# Patient Record
Sex: MALE | Race: WHITE | NOT HISPANIC OR LATINO | Employment: FULL TIME | URBAN - METROPOLITAN AREA
[De-identification: names, ages, dates, MRNs, and addresses within clinical notes are randomized per-mention and may not be internally consistent; named-entity substitution may affect disease eponyms.]

---
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
## 2022-08-22 ENCOUNTER — OFFICE VISIT (OUTPATIENT)
Dept: BEHAVIORAL HEALTH | Facility: CLINIC | Age: 22
End: 2022-08-22
Payer: COMMERCIAL

## 2022-08-22 DIAGNOSIS — F43.12 PROLONGED POSTTRAUMATIC STRESS DISORDER: ICD-10-CM

## 2022-08-22 DIAGNOSIS — F43.12 CHRONIC POSTTRAUMATIC STRESS SYNDROME: ICD-10-CM

## 2022-08-22 PROCEDURE — 90791 PSYCH DIAGNOSTIC EVALUATION: CPT | Performed by: PSYCHOLOGIST

## 2022-08-30 ENCOUNTER — OFFICE VISIT (OUTPATIENT)
Dept: MEDICAL GROUP | Facility: LAB | Age: 22
End: 2022-08-30
Payer: COMMERCIAL

## 2022-08-30 VITALS
SYSTOLIC BLOOD PRESSURE: 110 MMHG | OXYGEN SATURATION: 95 % | BODY MASS INDEX: 28.84 KG/M2 | HEART RATE: 96 BPM | TEMPERATURE: 97.1 F | DIASTOLIC BLOOD PRESSURE: 60 MMHG | WEIGHT: 206 LBS | RESPIRATION RATE: 12 BRPM | HEIGHT: 71 IN

## 2022-08-30 DIAGNOSIS — R06.6 HICCUPS: ICD-10-CM

## 2022-08-30 DIAGNOSIS — R41.840 ATTENTION DEFICIT: ICD-10-CM

## 2022-08-30 PROBLEM — F43.12: Status: ACTIVE | Noted: 2022-08-30

## 2022-08-30 PROCEDURE — 99214 OFFICE O/P EST MOD 30 MIN: CPT | Performed by: NURSE PRACTITIONER

## 2022-08-30 RX ORDER — DEXTROAMPHETAMINE SACCHARATE, AMPHETAMINE ASPARTATE MONOHYDRATE, DEXTROAMPHETAMINE SULFATE AND AMPHETAMINE SULFATE 2.5; 2.5; 2.5; 2.5 MG/1; MG/1; MG/1; MG/1
10 CAPSULE, EXTENDED RELEASE ORAL EVERY MORNING
Qty: 30 CAPSULE | Refills: 0 | Status: SHIPPED | OUTPATIENT
Start: 2022-08-30 | End: 2022-09-29

## 2022-08-30 ASSESSMENT — PATIENT HEALTH QUESTIONNAIRE - PHQ9: CLINICAL INTERPRETATION OF PHQ2 SCORE: 0

## 2022-08-30 NOTE — PROGRESS NOTES
Name: Jeromy Neumann Date: [birth year]  [birth year] Time in session 60 minutes   [x] Initial Dx Eval [] Family [] Cancelled/Rescheduled [] Office visit   [] Individual [] Group [] Late Cancellation [] Virtual Session   [] Couple [] Testing [] No call/No show [] Late   [] Discharge [] Phone [x] On time: 3:00 PM []  (1)   Attended: Mr. Neumann (He wore a mask)   Observations/ Appearance/ Affect: Mr. Neumann appeared clean, well-groomed, and dressed in casual clothes. He was oriented to person, place, time and purpose, was pleasant and cooperative, lucid and articulate.  His affect was anxious and mood was sad. No suicidal or homicidal ideation described or reported. No reports of hallucinations or confusions. He participated well in this session.   Content/ Topics: Mr. Neumann reported that he was sometime anxious and depressed. He reported that he had been through a lot in his life. He reported that he thought he might have an attention deficit disorder. He reported that he experienced closed head injuries in his life and may have had concussions. He reported that when he was a child he fell and hit the back his head on the ground playing soccer. He reported that he did not remember how long he was unconscious. He reported that he needed a “deep cleaning of his brain.”    Risk issues assessed: Discussed with Mr. Neumann his current concerns regarding the Coronavirus and his concerns about contagion. For information about this virus go to the Center for Disease Control website or the [facility] Coronavirus Resource Center ([email or web address].[email or web address].[email or web address]/) for accurate information about it. Discussed how recommended precautions seemed prudent: wash hand frequently, avoid crowds, wear masks when out and about, maintain social distancing ( ? 2 meters) cover mouth when coughing, and stay home if sick until better. Discussed how [facility] is providing Telehealth psychotherapy services using the [employer] Platform in a safe, secure and  high-quality format. Discussed using the Zoom Platform if needed. Discussed how the electronic record-keeping platform here at [facility] can limit access to the psychotherapy notes and that this psychologist must william the notes as sensitive. Encouraged him to discuss with this psychologist any difficulty he may have in accessing his notes. He consented to the additional firewalls. (PHQ-9 = 17, SHARON-7 = 8). He reported that he would not harm himself and did not intend to harm himself or another.   Psychosocial and environmental problems addressed: Discussed with Mr. Neumann how the symptoms of anxiety, depression, and inattentiveness, posttraumatic stress, poor diet, forgetfulness, and poor sleep described are often observed and interpreted as marked disruptions to the Social Engagement System. Discussed how these shifts in physiological and behavioral states, distort social awareness and establish habitual defensive reactions that replace appropriate spontaneous social behavior. Discussed with his how over the next several session he will learn how the body is wired for survival and connection. Discussed how the autonomic nervous system works as our personal surveillance system, always on guard, always alert to the question: “is it safe?” The work of the autonomic nervous system is to protect us by looking for cues safety and risk, sensing moment to moment of what is happening in and around our bodies and in the connections, we have to others. Discussed how over the next several sessions we will talk more about how this system works and can inform us about how the state of the nervous system sets the table for how we will respond, act and feel. Reminded Mr. Neumann how he has been in a state of anxiety and he is highly attuned to cues of danger, real or imagined. The traumatic events and concussive events seemed to have deeply affected his ability to function. The compromised nervous system is locked in an adaptive survival  responses shaped by traumatic or overwhelming events in a story of survival that persists automatically. Discussed how the re-patterning the autonomic nervous system is possible. Ongoing experiences continue to shape the autonomic nervous system. Our approach involves interrupting the traumatic pattern with experiences that exercise the neural circuits of connection. Developing a state of connection from a state of protection makes restoration possible. Discussed with him how he will learn strategies to increase ventral vagal moments at home. Encouraged him to be aware of his feelings of safety and connection here at [facility].     Current GAF: 55   Current medications: Wellbutrin, Prilosec, Kenalog   Significant/ Recent Events: He reported that he experienced, witnessed, or was confronted with events that involved actual or threatened death or serious injury, or a threat to the physical integrity of self or others. He reported that he experienced recurrent and intrusive distressing recollections of the traumatic events, including images, thoughts, or perceptions. He reported that he experienced recurrent distressing dreams of the traumatic events. He reported that he acted or felt as if the traumatic events were recurring (including a sense of reliving the experiences, illusions, hallucinations, and dissociative flashback episodes, including those that occur on awakening or when intoxicated). He reported that he made efforts to avoid thoughts, feelings, or conversations associated with the traumatic events and often experienced detachment or estrangement from others. He reported that he made efforts to avoid activities, places, or people that arouse recollections of the traumatic events and experienced a markedly diminished interest or participation in significant activities. He reported that he had little interest in doing things and felt down or depressed. He reported that he had trouble falling and staying asleep. He  reported that he felt tired and had trouble concentrating. He reported that he felt nervous, anxious and on edge. He reported that he worried too much about different things. He reported that he had difficulty relaxing. He reported that he was restless and had difficulty sitting still. He reported that he was often easily annoyed and irritable. He reported that he often felt afraid that something bad would happen. He reported that he experienced a stressful childhood and experienced poor reasoning and judgement, poor abstract reasoning and attention deficits, impulsivity, poor organization, slow processing speed, poor working memory, poor concentration, and forgetfulness. Mr. Neumann reported that he was frustrated with the symptoms described and the impact they have in his life.  Discussed how the symptoms suggest a diagnosis of Posttraumatic Stress Disorder. Discussed how other disorders may be present. Discussed with Mr. Neumann how he might consider Eye Movement Desensitization and Reprocessing (EMDR) as a therapeutic approach to decrease his fears and anxieties associated with traumatic events. EMDR is an Information Processing Therapy that uses an eight phase approach. In the first phase his readiness for EMDR is assessed to develop a treatment plan. For adults we use the “EMDR the Breakthrough Therapy for Overcoming Anxiety, Stress and Trauma,” by Shirley Conner and Donna Keating to explain this process. Discussed the work of Shama Hinojosa and encouraged him to think of food as medicine and encouraged him to look at some of her recipes in her book, “Eat Right, Feel Right.” Discussed the work of Saba Sousa and encourage him to read her workbook, “Mindful Self-Compassion” and discussed strategies to exercise mindful self-compassion as needed. Encouraged him to learn more about Polyvagal Theory and to consider the work of Davina Pinon. Encouraged him to contain his anxious thoughts and set them aside to  discuss in these session and to engage in positive and creative activities to distract him from any disturbing material. Discussed how he might consider SPECT Studies or functional neuroimaging studies to clarify and/ or identify neurological weaknesses, strengths and abilities and support appropriate intervention strategies (Full SPECT Evaluation at the [facility]: [address], [place] [address] [phone]: website: [employer]). Discussed looking at the [place] and consider nutritional supplements to increase the nutritional values in his diet. Discussed how Polyvagal Theory, Positive Psychology (Mindful Self-Compassion), CBT, and Information Processing Theory would inform this psychotherapy and how he will develop Cognitive Behavioral Skills and Strategies to decrease and manage anxieties and fears, decrease posttraumatic stress, increase attention span, lift depression, and help him to gain control over his life, develop a healthier lifestyle, increase restful sleep and find meaning and balance in his life. He asked to return to [facility] weekly to develop this supportive psychotherapy. Discussed how this psychotherapy would focus on his health, wellbeing, and progress at his pace and in a positive direction.   Informed consent issues discussed: Mr. Neumann reported that he understood the process of this evaluation agreed to return to [facility]. Discussed how he expressed his desire to change and was willing to start the process. He reported that he was willing to make changes to his life toward a healthy lifestyle. He reported that he was aware of the problems he experienced and expressed the desire to solve those problems safely. He reported that he had a positive outlook for change. He reported that he had family and friends that were supportive and knew he needed to gain and maintain a healthy network of support.    Assignments/ Homework: Mr. Neumann agreed to initiate some of the  strategies discussed today to decrease anxieties and increase restful sleep and increase nutritional values in his diet.    Plan: Mr. Neumann agreed to monitor closely his mood and behavior.    Diagnoses: F43.12 Posttraumatic Stress Disorder [] Provisional   Treatment Plan: [] Continued [x] New [] Replaced Referral:   Suicidal [] Yes [x] No [] Medical:    Violence: [] Yes [x] No [] Psychiatric:    Next session:     [] Neurological:            Kush Quintanilla Psy.D.  Clinical Psychologist  [facility]  NPI: [id number]

## 2022-08-30 NOTE — PROGRESS NOTES
"CC  ADD    HPI:  Jeromy is a 22-year-old established male here with 2 issues:  #1- hiccuping:  new issue x a month.  No new diet.  No lifestyle changes.  Tried a combination of omeprazole, Benadryl and marijuana which helped.  Does have heartburn.  No other associated symptoms.  Enjoys eating at [employer] frequently for breakfast, fried and spicy foods.  Overall states he really does not eat very healthy.  #2-attention deficit: He is currently seeing a psychologist and his psychologist told him to ask me to allow him to try attention deficit disorder medications while they are waiting on testing.  Is not able to see a psychologist again until October.  Bothered by \"my head is loud,\" memory loss, can't think, difficulty focusing on reading.  Stays very busy with job, theater and school.  Does find Wellbutrin helpful for depression and anxiety.  Sleep has improved lately.    Exam:  /60 (BP Location: Right arm, Patient Position: Sitting, BP Cuff Size: Large adult)   Pulse 96   Temp 36.2 °C (97.1 °F)   Resp 12   Ht 1.81 m (5' 11.26\")   Wt 93.4 kg (206 lb)   SpO2 95%   Gen. appears healthy in no distress   Skin appropriate for sex and age   Neck trachea is midline  Lungs unlabored breathing  Heart regular rate  Neuro gait and station normal   Psych appropriate, calm, interactive, well-groomed    A/P:  1. Hiccups          Downgrade omeprazole to pepcid AC - before breakfast and dinner for 1-2 weeks during periods of frequent hiccupping.  Avoid spicy / fried foods and large meals / overeating.  Contact me if this is not helpful.    2.  Likely ADD:   Seeing psychology and awaiting further testing.  Has home testing to turn into psychology as well.  Trial of adderall 10 mg XR once daily in the morning.  Discussed potential side effects as well as benefits and how to take Adderall.  He will contact me if he begins to have insomnia, heart palpitations, increased anxiety, dramatic weight loss.  Otherwise I will see " him back here in 1 month.  I encouraged him to start an exercise program.

## 2022-09-29 ENCOUNTER — OFFICE VISIT (OUTPATIENT)
Dept: MEDICAL GROUP | Facility: LAB | Age: 22
End: 2022-09-29
Payer: COMMERCIAL

## 2022-09-29 VITALS
SYSTOLIC BLOOD PRESSURE: 100 MMHG | HEIGHT: 71 IN | BODY MASS INDEX: 27.86 KG/M2 | WEIGHT: 199 LBS | TEMPERATURE: 97.1 F | DIASTOLIC BLOOD PRESSURE: 64 MMHG | OXYGEN SATURATION: 96 % | RESPIRATION RATE: 12 BRPM | HEART RATE: 88 BPM

## 2022-09-29 DIAGNOSIS — F43.12 CHRONIC POSTTRAUMATIC STRESS SYNDROME: ICD-10-CM

## 2022-09-29 DIAGNOSIS — R41.840 ATTENTION DEFICIT: ICD-10-CM

## 2022-09-29 DIAGNOSIS — Z23 NEED FOR INFLUENZA VACCINATION: ICD-10-CM

## 2022-09-29 PROCEDURE — 99214 OFFICE O/P EST MOD 30 MIN: CPT | Mod: 25 | Performed by: NURSE PRACTITIONER

## 2022-09-29 PROCEDURE — 90686 IIV4 VACC NO PRSV 0.5 ML IM: CPT | Performed by: NURSE PRACTITIONER

## 2022-09-29 PROCEDURE — 90471 IMMUNIZATION ADMIN: CPT | Performed by: NURSE PRACTITIONER

## 2022-09-29 RX ORDER — DEXTROAMPHETAMINE SACCHARATE, AMPHETAMINE ASPARTATE MONOHYDRATE, DEXTROAMPHETAMINE SULFATE AND AMPHETAMINE SULFATE 3.75; 3.75; 3.75; 3.75 MG/1; MG/1; MG/1; MG/1
15 CAPSULE, EXTENDED RELEASE ORAL EVERY MORNING
Qty: 30 CAPSULE | Refills: 0 | Status: SHIPPED | OUTPATIENT
Start: 2022-09-29 | End: 2022-11-11 | Stop reason: SDUPTHER

## 2022-09-29 NOTE — PROGRESS NOTES
"CC  Add f/u        HPI:  Jeromy is a 22-year-old established male here to follow-up on attention deficit.  Also followed by psychology.  Adderall initiated at our visit on August 30 -Adderall XR 10 mg daily. Denies insomnia, heart palpitations, diarrhea or increased anxiety.  Focus and motivation is better.  Headache when adderall wore off in the first week or so but this resolved.  Denies any current negative side effects of adderall.      Anxiety / depression controlled with wellbutrin.     Exam:  /64 (BP Location: Left arm, Patient Position: Sitting, BP Cuff Size: Large adult)   Pulse 88   Temp 36.2 °C (97.1 °F)   Resp 12   Ht 1.81 m (5' 11.26\")   Wt 90.3 kg (199 lb)   SpO2 96%   Gen: NAD  Resp: nonlabored.  Able to speak in full sentences  Psy: pleasant / cooperative.   Neuro:  Alert and oriented x 3    A/P:  1. Attention deficit  -improving but feels that he needs a higher dosage. Trial of 15 mg for the next month and then f/u via my chart with how he responds to adderall.    - amphetamine-dextroamphetamine (ADDERALL XR) 15 MG XR capsule; Take 1 Capsule by mouth every morning for 30 days.  Dispense: 30 Capsule; Refill: 0    2. Chronic posttraumatic stress syndrome  -Stable with Wellbutrin, psychology and Adderall.  Looking forward to moving to [place] to work at [employer] for 9 months.    3. Need for influenza vaccination  - INFLUENZA VACCINE QUAD INJ (PF)      In terms of the patient moving to [place] from January until August, I encouraged him to  look into mail order pharmacy for 8 month stent living in [place].   - other option : call local pharmacy in [place] to see if we can send through Adderal in [place] but typically [place] will not take controlled substances from [place], especially nurse practitioner.  Final option would be filling a 90-day supply immediately prior to leaving and then having a family member bring a refill when they come to visit in a few months.  "

## 2022-10-04 ENCOUNTER — OFFICE VISIT (OUTPATIENT)
Dept: BEHAVIORAL HEALTH | Facility: CLINIC | Age: 22
End: 2022-10-04
Payer: COMMERCIAL

## 2022-10-04 DIAGNOSIS — F90.0 ATTENTION DEFICIT HYPERACTIVITY DISORDER (ADHD), INATTENTIVE TYPE, MODERATE: ICD-10-CM

## 2022-10-04 DIAGNOSIS — F43.12 CHRONIC POSTTRAUMATIC STRESS SYNDROME: ICD-10-CM

## 2022-10-04 PROCEDURE — 90837 PSYTX W PT 60 MINUTES: CPT | Performed by: PSYCHOLOGIST

## 2022-10-06 PROBLEM — F90.0 ATTENTION DEFICIT HYPERACTIVITY DISORDER (ADHD), INATTENTIVE TYPE, MODERATE: Status: ACTIVE | Noted: 2022-10-06

## 2022-10-06 NOTE — PROGRESS NOTES
Name: Jeromy Neumann Date: 10/4/22  3/28/02 Time in session 60 minutes   [] Initial Dx Eval [] Family [] Cancelled/Rescheduled [] Office visit   [x] Individual [] Group [] Late Cancellation [] Virtual Session   [] Couple [] Testing [] No call/No show [] Late   [] Discharge [] Phone [x] On time: 8:00 AM []  (2)   Attended: Mr. Neumann (He wore a mask)   Observations/ Appearance/ Affect: Mr. Neumnan appeared clean, well-groomed, and dressed in casual clothes. He was oriented to person, place, time and purpose, was pleasant and cooperative, lucid and articulate.  His affect was anxious and mood was sad. No suicidal or homicidal ideation described or reported. No reports of hallucinations or confusions. He participated well in this session.   Content/ Topics: Mr. Neumann reported that he was doing well. He reported that his doctor started him on Adderall and he would start taking 15mg daily in a few days. He reported that he struggled to eat healthy. He reported that getting restful sleep was difficult. He reported that he was often very busy late at night or early in the morning. 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 He reported that next week he starts as the  at [employer] in the [employer]. He reported that in January he leaves for [place]. He reported that he was accepted in [employer] (a prestigious  while living at [facility]). He reported that he was anxious because he has not lived away from home, did not know how to live on his own and that he would be alone there.  On the ADHD Adult Symptoms Checklist, he indicated he had symptoms of hyperactivity (that he talks excessively and often feels overwhelmed) and inattention (he complains that he reads, “but it doesn't register,” that he is frustrated over his inability to organize, experiences poor time management, tends to misplace  belongings, was easily distracted and prefers multi-tasking). On the ASRS (Adult Self-Report Scale) Part A (symptoms of inattention) score was a 25, and Part B (symptoms of hyperactivity and impulsivity) was a score of 17. These scores supported the diagnosis of an Attention Deficit Disorder.    Risk issues assessed: Discussed with Mr. Neumann how there are shifts in physiological and behavioral states that distort social awareness and establish habitual defensive reactions that replace appropriate spontaneous social behavior. Discussed with him how the body is wired for survival and connection. Discussed how the autonomic nervous system works as our personal surveillance system, always on guard, always alert to the question: “is it safe?” The work of the autonomic nervous system is to protect us by looking for cues safety and risk, sensing moment to moment of what is happening in and around our bodies and in the connections, we have to others. Discussed how this system works and can inform us about how the state of the nervous system sets the table for how we will respond, act and feel. Reminded him how his medication regimen continues to be adjusted and he will be able to stay attentive, focused, organized and feel safe and connected with others. He reported that he would not harm himself and did not intend to harm himself or another.   Psychosocial and environmental problems addressed: He reported that he was not bothered by or disturbed by symptoms consistent with posttraumatic stress disorder. Discussed how on his medication he felt better. Reminded Mr. Neumann how the re-patterning the autonomic nervous system is possible. Ongoing experiences continue to shape the autonomic nervous system. Our approach involves interrupting the traumatic pattern with experiences that exercise the neural circuits of connection. Developing a state of connection from a state of protection makes restoration possible. Discussed how  he has been in a state of anxiety and defensiveness for a very long time and it will take some practice to re-pattern to a state of safety and connection. Discussed how in the state of defensiveness he is highly tuned to identify and recognize cues of danger, real or imagined, and may often feel anxious, distracted and inattentive and may have moments of disconnection and dissociates and will struggle to engage and participate. Discussed how the work of the autonomic nervous system is to protect us by looking for cues safety and risk, sensing moment to moment of what is happening in and around our bodies and in the connections, we have to others. Discussed how this system works and can inform us about how the state of the nervous system sets the table for how we will respond, act and feel. Discussed how the vagus nerve helps to regulate the heart and works to speed up or slow down our heart rate and effectively respond to any given moment. Discussed how this is called the vagal brake. It is this interaction between the parasympathetic and sympathetic nervous systems. The parasympathetic keeps the nervous system charged and the brake allows for more of the sympathetic energy into the system. Discussed how our breath helps us manage this brake. Using guided imagery to find and access a Safe Place is a way of exercising control of the vagal brake through relaxing breathing. Davina Pinon reminds us that every breath cycle is the vagal brake at work. By using your Safe Place, you are connecting to your connection with this brake and its built-in pattern of relaxation on the inhalation and reengagement on the exhalation.  This braking action brings flexibility to our responses and a sense of ease to transitions.   Current GAF: 55   Current medications: Adderall XR, Wellbutrin, Prilosec, Kenalog   Significant/ Recent Events: Discussed with Mr. Neumann how the focus of this psychotherapy is on skill building, on supporting his  physical and strengthening his mental health and increasing function. He demonstrated some positive affirmations without noticing them. Discussed how affirmations were helpful and skillful at thinking constructively and effectively change the tone and purpose of the actions. Described how affirmations were brief, positively worded statements in the “present tense.” Discussed the work of Shama Hinojosa and encouraged him to think of food as medicine and encouraged him to make some of her recipes in her book, “Eat Right, Feel Right.” Discussed the work of Saba Sousa and encourage him to read her workbook, “Mindful Self-Compassion” and exercise mindful self-compassion as needed. Encouraged him to learn more about Polyvagal Theory and to consider the work of Davina Pinon. Encouraged him to contain his anxious thoughts and set them aside to discuss in these session and to engage in positive and creative activities to distract him from any disturbing material. Discussed looking at the [place] and consider nutritional supplements to increase the nutritional values in his diet. Discussed how Polyvagal Theory, Positive Psychology (Mindful Self-Compassion), CBT, and Information Processing Theory would inform this psychotherapy and how he will develop Cognitive Behavioral Skills and Strategies to decrease and manage anxieties and fears, decrease posttraumatic stress, increase attention span, lift depression, and help him to gain control over his life, develop a healthier lifestyle, increase restful sleep and find meaning and balance in his life. He asked to return to [facility] weekly to develop this supportive psychotherapy. Discussed how this psychotherapy would focus on his health, wellbeing, and progress at his pace and in a positive direction.   Informed consent issues discussed: Mr. Neumann reported that he understood the process of this evaluation agreed to return to [facility]. Discussed how he expressed his desire to change and was  willing to start the process. He reported that he was willing to make changes to his life toward a healthy lifestyle. He reported that he was aware of the problems he experienced and expressed the desire to solve those problems safely. He reported that he had a positive outlook for change. He reported that he had family and friends that were supportive and knew he needed to gain and maintain a healthy network of support.    Assignments/ Homework: Mr. Neumann agreed to initiate some of the strategies discussed today to decrease anxieties and increase restful sleep and increase nutritional values in his diet. He agreed to develop a list of affirmations and to practice the vagal brake.   Plan: Mr. Neumann agreed to monitor closely his mood and behavior.    Diagnoses: F90.0 Attention-Deficit/ Hyperactivity Disorder, F43.12 Posttraumatic Stress Disorder [] Provisional   Treatment Plan: [x] Continued [] New [] Replaced Referral:   Suicidal [] Yes [x] No [] Medical:    Violence: [] Yes [x] No [] Psychiatric:    Next session:     [] Neurological:            Kush Quintanilla Psy.D.  Clinical Psychologist  [facility]  NPI: [id number]

## 2022-11-01 ENCOUNTER — OFFICE VISIT (OUTPATIENT)
Dept: BEHAVIORAL HEALTH | Facility: CLINIC | Age: 22
End: 2022-11-01
Payer: COMMERCIAL

## 2022-11-01 DIAGNOSIS — F90.0 ATTENTION DEFICIT HYPERACTIVITY DISORDER (ADHD), INATTENTIVE TYPE, MODERATE: ICD-10-CM

## 2022-11-01 PROCEDURE — 90837 PSYTX W PT 60 MINUTES: CPT | Performed by: PSYCHOLOGIST

## 2022-11-07 NOTE — PROGRESS NOTES
Name: Jeromy Neumann Date: 22  3/28/02 Time in session 60 minutes   [] Initial Dx Eval [] Family [] Cancelled/Rescheduled [] Office visit   [x] Individual [] Group [] Late Cancellation [] Virtual Session   [] Couple [] Testing [] No call/No show [] Late   [] Discharge [] Phone [x] On time: 3:00 PM []  (3)   Attended: Mr. Neumann (He wore a mask)   Observations/ Appearance/ Affect: Mr. Neumann appeared clean, well-groomed, and dressed in casual clothes. He was oriented to person, place, time and purpose, was pleasant and cooperative, lucid and articulate.  His affect was anxious and mood was sad. No suicidal or homicidal ideation described or reported. No reports of hallucinations or confusions. He participated well in this session.   Content/ Topics: Mr. Neumann reported that he was doing well. He reported that his doctor started him on Adderall 15mg daily. He reported that he felt that it was almost working well. He reported that he takes it first thing in the morning. He reported that he experienced a tired haze by evening. He reported that he was enjoying reading, something he had never enjoyed. He reported that he was enjoying cooking.    Risk issues assessed: Discussed with Mr. Neumann how there are shifts in physiological and behavioral states that distort social awareness and establish habitual defensive reactions that replace appropriate spontaneous social behavior. Discussed with him how the body is wired for survival and connection. Discussed how the autonomic nervous system works as our personal surveillance system, always on guard, always alert to the question: “is it safe?” The work of the autonomic nervous system is to protect us by looking for cues safety and risk, sensing moment to moment of what is happening in and around our bodies and in the connections, we have to others. Discussed how this system works and can inform us about how the state of the nervous system sets the table for how we will  respond, act and feel. Reminded him how his medication regimen continues to be adjusted and he will be able to stay attentive, focused, organized and feel safe and connected with others. He reported that he did not experience posttraumatic stress over the past few weeks. He reported that he would not harm himself and did not intend to harm himself or another.   Psychosocial and environmental problems addressed: He reported that he was not bothered by or disturbed by symptoms consistent with posttraumatic stress disorder. Discussed how on his medication he felt better. Discussed how the work of the autonomic nervous system is to protect us by looking for cues safety and risk, sensing moment to moment of what is happening in and around our bodies and in the connections, we have to others. Discussed how this system works and can inform us about how the state of the nervous system sets the table for how we will respond, act and feel.   Current GAF: 55   Current medications: Adderall XR, Wellbutrin, Prilosec, Kenalog   Significant/ Recent Events: Discussed with Mr. Neumann how he liked the pace of this psychotherapy and noticed how much better he felt overall since he started coming to [facility]. Discussed how the re-patterning the autonomic nervous system is possible. Ongoing experiences continue to shape the autonomic nervous system. Our approach involves interrupting the traumatic pattern with experiences that exercise the neural circuits of connection. Developing a state of connection from a state of protection makes restoration possible. Discussed strategies to increase ventral vagal brakes. Encouraged him to be aware of his feelings of safety and connection here at [facility]. Discussed how psychotherapy is done safely. Discussed the work of Shama Hinojosa and encouraged him to think of food as medicine and encouraged him to make some of her recipes in her book, “Eat Right, Feel Right.” Discussed the work of Saba Sousa and  encourage him to read her workbook, “Mindful Self-Compassion” and exercise mindful self-compassion as needed. Encouraged him to learn more about Polyvagal Theory and to consider the work of Davina Pinon. Discussed looking at the [place] and consider nutritional supplements to increase the nutritional values in his diet. Discussed how Polyvagal Theory, Positive Psychology (Mindful Self-Compassion), CBT, and Information Processing Theory would inform this psychotherapy and how he will develop Cognitive Behavioral Skills and Strategies to decrease and manage anxieties and fears, decrease posttraumatic stress, increase attention span, lift depression, and help him to gain control over his life, develop a healthier lifestyle, increase restful sleep and find meaning and balance in his life. He asked to return to [facility] weekly to develop this supportive psychotherapy. Discussed how this psychotherapy would focus on his health, wellbeing, and progress at his pace and in a positive direction.   Informed consent issues discussed: Mr. Neumann reported that he understood the process of this evaluation agreed to return to [facility]. Discussed how he expressed his desire to change and was willing to start the process. He reported that he was willing to make changes to his life toward a healthy lifestyle. He reported that he was aware of the problems he experienced and expressed the desire to solve those problems safely. He reported that he had a positive outlook for change. He reported that he had family and friends that were supportive and knew he needed to gain and maintain a healthy network of support.    Assignments/ Homework: Mr. Neumann agreed to initiate some of the strategies discussed today to decrease anxieties and increase restful sleep and increase nutritional values in his diet. He agreed to develop a list of affirmations and to practice the vagal brake.   Plan: Mr. Neumann agreed to monitor closely his mood and behavior.     Diagnoses: F90.0 Attention-Deficit/ Hyperactivity Disorder, F43.12 Posttraumatic Stress Disorder [] Provisional   Treatment Plan: [x] Continued [] New [] Replaced Referral:   Suicidal [] Yes [x] No [] Medical:    Violence: [] Yes [x] No [] Psychiatric:    Next session:     [] Neurological:            Kush Quintanilla Psy.D.  Clinical Psychologist  [facility]  NPI: [id number]

## 2022-11-11 DIAGNOSIS — R41.840 ATTENTION DEFICIT: ICD-10-CM

## 2022-11-12 NOTE — TELEPHONE ENCOUNTER
Received request via: Patient    Was the patient seen in the last year in this department? Yes    Does the patient have an active prescription (recently filled or refills available) for medication(s) requested? No    Does the patient have [facility] and need 100 day supply (blood pressure, diabetes and cholesterol meds only)? Patient does not have [facility]  amphetamine-dextroamphetamine (ADDERALL XR) 15 MG XR capsule    Pcp ooo please advise on 30 days refill

## 2022-11-14 RX ORDER — DEXTROAMPHETAMINE SACCHARATE, AMPHETAMINE ASPARTATE MONOHYDRATE, DEXTROAMPHETAMINE SULFATE AND AMPHETAMINE SULFATE 3.75; 3.75; 3.75; 3.75 MG/1; MG/1; MG/1; MG/1
15 CAPSULE, EXTENDED RELEASE ORAL EVERY MORNING
Qty: 30 CAPSULE | Refills: 0 | Status: SHIPPED | OUTPATIENT
Start: 2022-11-14 | End: 2022-12-15 | Stop reason: SDUPTHER

## 2022-12-06 ENCOUNTER — OFFICE VISIT (OUTPATIENT)
Dept: BEHAVIORAL HEALTH | Facility: CLINIC | Age: 22
End: 2022-12-06
Payer: COMMERCIAL

## 2022-12-06 DIAGNOSIS — F90.0 ATTENTION DEFICIT HYPERACTIVITY DISORDER (ADHD), INATTENTIVE TYPE, MODERATE: ICD-10-CM

## 2022-12-06 DIAGNOSIS — F43.12 CHRONIC POSTTRAUMATIC STRESS SYNDROME: ICD-10-CM

## 2022-12-06 PROCEDURE — 90837 PSYTX W PT 60 MINUTES: CPT | Performed by: PSYCHOLOGIST

## 2022-12-08 NOTE — PROGRESS NOTES
Name: Jeromy Neumann Date: 22  3/28/02 Time in session 60 minutes   [] Initial Dx Eval [] Family [] Cancelled/Rescheduled [] Office visit   [x] Individual [] Group [] Late Cancellation [] Virtual Session   [] Couple [] Testing [] No call/No show [] Late   [] Discharge [] Phone [x] On time: 3:00 PM []  (4)   Attended: Mr. Neumann (He wore a mask)   Observations/ Appearance/ Affect: Mr. Neumann appeared clean, well-groomed, and dressed in warm clothes. He was oriented to person, place, time, and purpose, was pleasant and cooperative, lucid, and articulate. His affect was pleasant, and mood was euthymic. No suicidal or homicidal ideation described or reported. No reports of hallucinations or confusions. He participated well in this session.   Content/ Topics: Mr. Neumann reported that he was doing well. He reported that his doctor started him on Adderall 15mg daily. He reported that he still struggled with moments of impulsivity but was more aware of that and was able to be more mindful and “reign it in.” He reported that he takes his medication first thing in the morning. He reported that he was often tapping, and it was helpful. He reported that he noticed that he was easily triggered when someone is talking to him and calls him by his first name. He reported that it makes him feel like he did something wrong. He reported that he is also triggered when someone says, “we need to talk.” He reported that he used to work very hard not to forget things and now that his brain is working better, he felt he did not have to work that hard anymore, but now he was forgetful. Discussed how it was prudent to talk with his doctor about adjusting the medication regimen. He reported that he noticed that when it wears off it was not a steep drop off. 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 He reported that he was  anxious. He reported that things were going well here, and he felt made it much harder to leave.    Risk issues assessed: Discussed with Mr. Neumann how there are shifts in physiological and behavioral states that distort social awareness and establish habitual defensive reactions that replace appropriate spontaneous social behavior. Discussed with him how the body is wired for survival and connection. Discussed how the autonomic nervous system works as our personal surveillance system, always on guard, always alert to the question: “is it safe?” The work of the autonomic nervous system is to protect us by looking for cues safety and risk, sensing moment to moment of what is happening in and around our bodies and in the connections, we have to others. Discussed how this system works and can inform us about how the state of the nervous system sets the table for how we will respond, act, and feel. Reminded him how his medication regimen will continue to be adjusted and he will be able to stay attentive, focused, organized, and feel safe and connected with others. He reported that he would not harm himself and did not intend to harm himself or another.   Psychosocial and environmental problems addressed: He reported that he was aware of symptoms consistent with posttraumatic stress disorder. Discussed how tapping was helpful. Discussed how the work of the autonomic nervous system is to protect us by looking for cues safety and risk, sensing moment to moment of what is happening in and around our bodies and in the connections, we have to others. Discussed how this system works and can inform us about how the state of the nervous system sets the table for how we will respond, act, and feel.   Current GAF: 55   Current medications: Adderall XR, Wellbutrin, Prilosec, Kenalog   Significant/ Recent Events: Discussed with Mr. Neumann how he liked the pace of this psychotherapy and noticed how much better he felt overall since  he started coming to [facility]. Discussed how the re-patterning the autonomic nervous system is possible. Ongoing experiences continue to shape the autonomic nervous system. Our approach involves interrupting the traumatic pattern with experiences that exercise the neural circuits of connection. Developing a state of connection from a state of protection makes restoration possible. Discussed strategies to increase ventral vagal brakes. Encouraged him to be aware of his feelings of safety and connection here at [facility]. Discussed how psychotherapy is done safely. Discussed the work of Shama Hinojosa and encouraged him to think of food as medicine and encouraged him to make some of her recipes in her book, “Eat Right, Feel Right.” Discussed the work of Saba Sousa and encourage him to read her workbook, “Mindful Self-Compassion” and exercise mindful self-compassion as needed. Encouraged him to learn more about Polyvagal Theory and to consider the work of Davina Pinon. Discussed looking at the [employer] and consider nutritional supplements to increase the nutritional values in his diet. Discussed how Polyvagal Theory, Positive Psychology (Mindful Self-Compassion), CBT, and Information Processing Theory would inform this psychotherapy and how he will develop Cognitive Behavioral Skills and Strategies to decrease and manage anxieties and fears, decrease posttraumatic stress, increase attention span, lift depression, and help him to gain control over his life, develop a healthier lifestyle, increase restful sleep and find meaning and balance in his life. He asked to return to [facility] weekly to develop this supportive psychotherapy. Discussed how this psychotherapy would focus on his health, wellbeing, and progress at his pace and in a positive direction.   Informed consent issues discussed: Mr. Neumann reported that he understood the process of this evaluation agreed to return to [facility]. Discussed how he expressed his desire to change and was  willing to start the process. He reported that he was willing to make changes to his life toward a healthy lifestyle. He reported that he was aware of the problems he experienced and expressed the desire to solve those problems safely. He reported that he had a positive outlook for change. He reported that he had family and friends that were supportive and knew he needed to gain and maintain a healthy network of support.    Assignments/ Homework: Mr. Neumann agreed to initiate some of the strategies discussed today to decrease anxieties and increase restful sleep and increase nutritional values in his diet. He agreed to develop a list of affirmations and to practice the vagal brake.   Plan: Mr. Neumann agreed to monitor closely his mood and behavior.    Diagnoses: F90.0 Attention-Deficit/ Hyperactivity Disorder, F43.12 Posttraumatic Stress Disorder [] Provisional   Treatment Plan: [x] Continued [] New [] Replaced Referral:   Suicidal [] Yes [x] No [] Medical:    Violence: [] Yes [x] No [] Psychiatric:    Next session:     [] Neurological:            Kush Quintanilla Psy.D.  Clinical Psychologist  [facility]  NPI: [id number]

## 2022-12-15 DIAGNOSIS — R41.840 ATTENTION DEFICIT: ICD-10-CM

## 2022-12-16 NOTE — TELEPHONE ENCOUNTER
Received request via: Pharmacy    Was the patient seen in the last year in this department? Yes  9/29/2022  Does the patient have an active prescription (recently filled or refills available) for medication(s) requested? No    Does the patient have residential Plus and need 100 day supply (blood pressure, diabetes and cholesterol meds only)? Patient does not have SCP

## 2022-12-19 RX ORDER — DEXTROAMPHETAMINE SACCHARATE, AMPHETAMINE ASPARTATE MONOHYDRATE, DEXTROAMPHETAMINE SULFATE AND AMPHETAMINE SULFATE 3.75; 3.75; 3.75; 3.75 MG/1; MG/1; MG/1; MG/1
15 CAPSULE, EXTENDED RELEASE ORAL EVERY MORNING
Qty: 30 CAPSULE | Refills: 0 | Status: SHIPPED | OUTPATIENT
Start: 2022-12-19 | End: 2022-12-28 | Stop reason: SDUPTHER

## 2022-12-28 ENCOUNTER — OFFICE VISIT (OUTPATIENT)
Dept: MEDICAL GROUP | Facility: LAB | Age: 22
End: 2022-12-28
Payer: COMMERCIAL

## 2022-12-28 VITALS
RESPIRATION RATE: 12 BRPM | TEMPERATURE: 97.6 F | HEART RATE: 94 BPM | DIASTOLIC BLOOD PRESSURE: 70 MMHG | HEIGHT: 71 IN | WEIGHT: 196 LBS | BODY MASS INDEX: 27.44 KG/M2 | SYSTOLIC BLOOD PRESSURE: 110 MMHG | OXYGEN SATURATION: 97 %

## 2022-12-28 DIAGNOSIS — F90.0 ATTENTION DEFICIT HYPERACTIVITY DISORDER (ADHD), INATTENTIVE TYPE, MODERATE: ICD-10-CM

## 2022-12-28 PROCEDURE — 99213 OFFICE O/P EST LOW 20 MIN: CPT | Performed by: NURSE PRACTITIONER

## 2022-12-28 RX ORDER — DEXTROAMPHETAMINE SACCHARATE, AMPHETAMINE ASPARTATE MONOHYDRATE, DEXTROAMPHETAMINE SULFATE AND AMPHETAMINE SULFATE 3.75; 3.75; 3.75; 3.75 MG/1; MG/1; MG/1; MG/1
15 CAPSULE, EXTENDED RELEASE ORAL EVERY MORNING
Qty: 30 CAPSULE | Refills: 0 | Status: SHIPPED | OUTPATIENT
Start: 2023-01-18 | End: 2023-04-04 | Stop reason: SDUPTHER

## 2022-12-28 RX ORDER — DEXTROAMPHETAMINE SACCHARATE, AMPHETAMINE ASPARTATE MONOHYDRATE, DEXTROAMPHETAMINE SULFATE AND AMPHETAMINE SULFATE 3.75; 3.75; 3.75; 3.75 MG/1; MG/1; MG/1; MG/1
15 CAPSULE, EXTENDED RELEASE ORAL EVERY MORNING
Qty: 30 CAPSULE | Refills: 0 | Status: SHIPPED | OUTPATIENT
Start: 2023-02-17 | End: 2023-04-04 | Stop reason: SDUPTHER

## 2022-12-28 RX ORDER — DEXTROAMPHETAMINE SACCHARATE, AMPHETAMINE ASPARTATE MONOHYDRATE, DEXTROAMPHETAMINE SULFATE AND AMPHETAMINE SULFATE 3.75; 3.75; 3.75; 3.75 MG/1; MG/1; MG/1; MG/1
15 CAPSULE, EXTENDED RELEASE ORAL EVERY MORNING
Qty: 30 CAPSULE | Refills: 0 | Status: SHIPPED | OUTPATIENT
Start: 2023-03-19 | End: 2023-04-04

## 2022-12-29 NOTE — PROGRESS NOTES
"CC  F/u    HPI:    Jeromy is a 22-year-old established male here for follow-up regarding attention deficit disorder.  Overall feeling well.    Attention deficit hyperactivity disorder (ADHD), inattentive type, moderate  Chronic issue.  Feeling really well with 50 mg Adderall XL.  Denies any negative side effects of Adderall.  Specifically denies diarrhea, increased anxiety or trouble sleeping.  Weight has been stable.       Exam:  /70 (BP Location: Left arm, Patient Position: Sitting, BP Cuff Size: Large adult)   Pulse 94   Temp 36.4 °C (97.6 °F)   Resp 12   Ht 1.81 m (5' 11.26\")   Wt 88.9 kg (196 lb)   SpO2 97%    Gen. appears healthy in no distress   Skin appropriate for sex and age   Neck trachea is midline  Lungs unlabored breathing  Heart regular rate  Neuro gait and station normal   Psych appropriate, calm, interactive, well-groomed      A/P:  \"  1. Attention deficit hyperactivity disorder (ADHD), inattentive type, moderate  amphetamine-dextroamphetamine (ADDERALL XR) 15 MG XR capsule    amphetamine-dextroamphetamine (ADDERALL XR) 15 MG XR capsule    amphetamine-dextroamphetamine (ADDERALL XR) 15 MG XR capsule        Overall condition is stable.   He needs to follow-up here in person or either in the state of [place] virtually within 6 months.     Moods are also stable with Wellbutrin .    In prescribing controlled substances to this patient, I certify that I have obtained and reviewed the medical history of Jeromy Neumann. I have also made a good myles effort to obtain applicable records from other providers who have treated the patient and records did not demonstrate any increased risk of substance abuse that would prevent me from prescribing controlled substances.     I have conducted a physical exam and documented it. I have reviewed Mr. Neumann’s prescription history as maintained by the [place] Prescription Monitoring Program.     I have assessed the patient’s risk for abuse, dependency, and " addiction using the validated Opioid Risk Tool available at https://www.[employer].com/nxfqmb-ovjm-iime-ort-narcotic-abuse.     Given the above, I believe the benefits of controlled substance therapy outweigh the risks. The reasons for prescribing controlled substances include non-narcotic, oral analgesic alternatives have been inadequate for pain control. Accordingly, I have discussed the risk and benefits, treatment plan, and alternative therapies with the patient.

## 2022-12-29 NOTE — ASSESSMENT & PLAN NOTE
Chronic issue.  Feeling really well with 50 mg Adderall XL.  Denies any negative side effects of Adderall.  Specifically denies diarrhea, increased anxiety or trouble sleeping.  Weight has been stable.  Moving to [place] to work for [employer] until next August but will be home periodically for Adderall refills.

## 2023-01-04 ENCOUNTER — TELEMEDICINE (OUTPATIENT)
Dept: BEHAVIORAL HEALTH | Facility: CLINIC | Age: 23
End: 2023-01-04
Payer: COMMERCIAL

## 2023-01-04 DIAGNOSIS — F32.A MILD DEPRESSION: ICD-10-CM

## 2023-01-04 DIAGNOSIS — F90.0 ATTENTION DEFICIT HYPERACTIVITY DISORDER (ADHD), INATTENTIVE TYPE, MODERATE: ICD-10-CM

## 2023-01-04 DIAGNOSIS — F43.12 CHRONIC POSTTRAUMATIC STRESS SYNDROME: ICD-10-CM

## 2023-01-04 PROCEDURE — 90832 PSYTX W PT 30 MINUTES: CPT | Mod: 95 | Performed by: PSYCHOLOGIST

## 2023-01-04 RX ORDER — BUPROPION HYDROCHLORIDE 150 MG/1
150 TABLET ORAL EVERY MORNING
Qty: 90 TABLET | Refills: 3 | Status: SHIPPED | OUTPATIENT
Start: 2023-01-04 | End: 2023-04-04 | Stop reason: SDUPTHER

## 2023-01-04 NOTE — TELEPHONE ENCOUNTER
Received request via: Pharmacy    Was the patient seen in the last year in this department? Yes  12/28/22  Does the patient have an active prescription (recently filled or refills available) for medication(s) requested? No    Does the patient have custodial Plus and need 100 day supply (blood pressure, diabetes and cholesterol meds only)? Patient does not have SCP

## 2023-01-05 NOTE — PROGRESS NOTES
Name: Jeromy Neumann Date: 23  3/28/02 Time in session 30 minutes   [] Initial Dx Eval [] Family [] Cancelled/Rescheduled [] Office visit   [x] Individual [] Group [] Late Cancellation [x] Virtual Session   [] Couple [] Testing [] No call/No show [] Late   [] Discharge [] Phone [x] On time: 10:00 AM []  (5)   Spoke with: Mr. Neumann (virtual session via Zoom Platform) Mr. Neumann gave consent and confirmed that he was in a quiet secure location and that he was at home in [place]. Mr. Neumann reported that he was aware that this was a tele-psychotherapy service from [facility].   Observations/ Appearance/ Affect: Mr. Neumann appeared clean, well-groomed, and dressed in warm clothes. He was oriented to person, place, time, and purpose, was pleasant and cooperative, lucid, and articulate. His affect was pleasant, and mood was euthymic. No suicidal or homicidal ideation described or reported. No reports of hallucinations or confusions. He participated well in this session.   Content/ Topics: Mr. Neumann reported that he was doing well. He reported that he was adjusting to the medication well and felt better. 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 He reported that there was a slight complication when a principal player came down with COVID 19 and had to be replaced but the understudy did well. He reported that he was mindful and focused. He reported that he went on a trip with some friends to [place] and had fun. He reported that he noticed that he was not eating as much but was making himself eat and that he was eating more nutritiously. He reported that he was sleeping better, sleeping more regularly and he was more organized. 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 He reported that he leaves on the  and will return in August. He reported that he was less anxious about going and was looking forward to the program. He  reported that he will be back in [place] for his Graduation (he is completing college credits in this internship), and he will return to [place] for a wedding.    Risk issues assessed: Discussed with Mr. Neumann how there are shifts in physiological and behavioral states that distort social awareness and establish habitual defensive reactions that replace appropriate spontaneous social behavior. Discussed with him how the body is wired for survival and connection. Discussed how the autonomic nervous system works as our personal surveillance system, always on guard, always alert to the question: “is it safe?” The work of the autonomic nervous system is to protect us by looking for cues safety and risk, sensing moment to moment of what is happening in and around our bodies and in the connections, we have to others. Discussed how this system works and can inform us about how the state of the nervous system sets the table for how we will respond, act, and feel. Reminded him how his medication regimen will continue to be adjusted and he will be able to stay attentive, focused, organized, and feel safe and connected with others. He reported that he would not harm himself and did not intend to harm himself or another.   Psychosocial and environmental problems addressed: Discussed how the practice of savoring moments of connection was important. Discussed how bringing active awareness to moments of connection and safety help to interrupt the pattern of negativity and support positive change: savoring the state of connection and savor the experience of safety. Discussed the work he had done in these sessions. Discussed how he was less anxious and more focused. Discussed how he was looking forward to this internship and how well he was connected to his friends here in [place]. He reported that he was taking his grandmother's recipes with him so he could practice some of them when he was in [place].    Current GAF: 55   Current  medications: Adderall XR, Wellbutrin, Prilosec, Kenalog   Significant/ Recent Events: Discussed with Mr. Neumann how he liked the pace of this psychotherapy and noticed how much better he felt overall since he started coming to [facility]. Discussed how the re-patterning the autonomic nervous system is possible. Ongoing experiences continue to shape the autonomic nervous system. Our approach involves interrupting the traumatic pattern with experiences that exercise the neural circuits of connection. Developing a state of connection from a state of protection makes restoration possible. Discussed strategies to increase ventral vagal brakes. Encouraged him to be aware of his feelings of safety and connection here at [facility]. Discussed how psychotherapy is done safely. Discussed the work of Shama Hinojosa and encouraged him to think of food as medicine and encouraged him to make some of her recipes in her book, “Eat Right, Feel Right.” Discussed the work of Saba Sousa and encourage him to read her workbook, “Mindful Self-Compassion” and exercise mindful self-compassion as needed. Encouraged him to learn more about Polyvagal Theory and to consider the work of Davina Pinon. Discussed looking at the [employer] and consider nutritional supplements to increase the nutritional values in his diet. Discussed how Polyvagal Theory, Positive Psychology (Mindful Self-Compassion), CBT, and Information Processing Theory would inform this psychotherapy and how he will develop Cognitive Behavioral Skills and Strategies to decrease and manage anxieties and fears, decrease posttraumatic stress, increase attention span, lift depression, and help him to gain control over his life, develop a healthier lifestyle, increase restful sleep and find meaning and balance in his life. He asked to return to [facility] weekly to develop this supportive psychotherapy. Discussed how this psychotherapy would focus on his health, wellbeing, and progress at his pace and in a  positive direction.   Informed consent issues discussed: Mr. Neumann reported that he understood the process of this evaluation agreed to return to [facility]. Discussed how he expressed his desire to change and was willing to start the process. He reported that he was willing to make changes to his life toward a healthy lifestyle. He reported that he was aware of the problems he experienced and expressed the desire to solve those problems safely. He reported that he had a positive outlook for change. He reported that he had family and friends that were supportive and knew he needed to gain and maintain a healthy network of support.    Assignments/ Homework: Mr. Neumann agreed to initiate some of the strategies discussed today to decrease anxieties and increase restful sleep and increase nutritional values in his diet. He agreed to develop a list of affirmations and to practice the vagal brake. He agreed to stay connected with this psychologist as needed and to return to [facility] in August if needed.    Plan: Mr. Neumann agreed to monitor closely his mood and behavior.    Diagnoses: F90.0 Attention-Deficit/ Hyperactivity Disorder, F43.12 Posttraumatic Stress Disorder [] Provisional   Treatment Plan: [x] Continued [] New [] Replaced Referral:   Suicidal [] Yes [x] No [] Medical:    Violence: [] Yes [x] No [] Psychiatric:    Next session:     [] Neurological:            Kush Quintanilla Psy.D.  Clinical Psychologist  [facility]  NPI: [id number]

## 2023-04-04 ENCOUNTER — TELEMEDICINE (OUTPATIENT)
Dept: MEDICAL GROUP | Facility: LAB | Age: 23
End: 2023-04-04
Payer: COMMERCIAL

## 2023-04-04 DIAGNOSIS — F90.0 ATTENTION DEFICIT HYPERACTIVITY DISORDER (ADHD), INATTENTIVE TYPE, MODERATE: ICD-10-CM

## 2023-04-04 DIAGNOSIS — F32.A MILD DEPRESSION: ICD-10-CM

## 2023-04-04 PROCEDURE — 99214 OFFICE O/P EST MOD 30 MIN: CPT | Mod: 95 | Performed by: NURSE PRACTITIONER

## 2023-04-04 RX ORDER — DEXTROAMPHETAMINE SACCHARATE, AMPHETAMINE ASPARTATE MONOHYDRATE, DEXTROAMPHETAMINE SULFATE AND AMPHETAMINE SULFATE 3.75; 3.75; 3.75; 3.75 MG/1; MG/1; MG/1; MG/1
15 CAPSULE, EXTENDED RELEASE ORAL EVERY MORNING
Qty: 30 CAPSULE | Refills: 0 | Status: SHIPPED | OUTPATIENT
Start: 2023-06-03 | End: 2023-07-09 | Stop reason: SDUPTHER

## 2023-04-04 RX ORDER — BUPROPION HYDROCHLORIDE 300 MG/1
300 TABLET ORAL EVERY MORNING
Qty: 90 TABLET | Refills: 3 | Status: SHIPPED | OUTPATIENT
Start: 2023-04-04

## 2023-04-04 RX ORDER — DEXTROAMPHETAMINE SACCHARATE, AMPHETAMINE ASPARTATE MONOHYDRATE, DEXTROAMPHETAMINE SULFATE AND AMPHETAMINE SULFATE 3.75; 3.75; 3.75; 3.75 MG/1; MG/1; MG/1; MG/1
15 CAPSULE, EXTENDED RELEASE ORAL EVERY MORNING
Qty: 30 CAPSULE | Refills: 0 | Status: SHIPPED | OUTPATIENT
Start: 2023-04-04 | End: 2023-05-04

## 2023-04-04 RX ORDER — DEXTROAMPHETAMINE SACCHARATE, AMPHETAMINE ASPARTATE MONOHYDRATE, DEXTROAMPHETAMINE SULFATE AND AMPHETAMINE SULFATE 3.75; 3.75; 3.75; 3.75 MG/1; MG/1; MG/1; MG/1
15 CAPSULE, EXTENDED RELEASE ORAL EVERY MORNING
Qty: 30 CAPSULE | Refills: 0 | Status: SHIPPED | OUTPATIENT
Start: 2023-05-04 | End: 2023-06-03

## 2023-04-04 NOTE — PROGRESS NOTES
"Virtual Visit: Established Patient   This visit was conducted via Zoom using secure and encrypted videoconferencing technology.   The patient was in their home in the [facility].    The patient's identity was confirmed and verbal consent was obtained for this virtual visit.    Subjective:   CC:   F/u     HPI:   Jeromy is a 23 y.o. est male presenting for evaluation and management of:    ADD f/u :  chronic issue and stable on adderall 15 mg XR - helps him with \"getting things done,\" helen with living on his own : more efficiently able to do chores, focus at work, etc.  Denies anorexia / insomnia / anxiety with adderall.      MDD:  moods stable with wellbutrin 300 mg XL.  Enjoying his job and life in general.  Denies negative side effects of Wellbutrin such as insomnia or increased anxiety.    Current medicines (including changes today)  Current Outpatient Medications   Medication Sig Dispense Refill    buPROPion (WELLBUTRIN XL) 150 MG XL tablet Take 1 Tablet by mouth every morning. 90 Tablet 3    amphetamine-dextroamphetamine (ADDERALL XR) 15 MG XR capsule Take 1 Capsule by mouth every morning for 30 days. 30 Capsule 0    triamcinolone acetonide (KENALOG) 0.1 % Cream Apply 1 Application topically 2 times a day. 28.4 g 1     No current facility-administered medications for this visit.       Patient Active Problem List    Diagnosis Date Noted    Attention deficit hyperactivity disorder (ADHD), inattentive type, moderate 10/06/2022    Chronic posttraumatic stress syndrome 08/30/2022    Flexural eczema 01/20/2021    Dysthymia 07/02/2019    Acne vulgaris 09/15/2017        Objective:   There were no vitals taken for this visit.    Physical Exam:  Gen: NAD  Resp: nonlabored.  Able to speak in full sentences  Psy: pleasant / cooperative.   Neuro:  Alert and oriented x 3    Assessment and Plan:   The following treatment plan was discussed:   1. Mild depression  -Stable.  Continue same.  Follow-up 6 months.  - buPROPion " (WELLBUTRIN XL) 300 MG XL tablet; Take 1 Tablet by mouth every morning.  Dispense: 90 Tablet; Refill: 3    2. Attention deficit hyperactivity disorder (ADHD), inattentive type, moderate  -Stable.  Continue same.  Follow-up 6 months.  Reviewed notes from psychology.  - amphetamine-dextroamphetamine (ADDERALL XR) 15 MG XR capsule; Take 1 Capsule by mouth every morning for 30 days.  Dispense: 30 Capsule; Refill: 0  - amphetamine-dextroamphetamine (ADDERALL XR) 15 MG XR capsule; Take 1 Capsule by mouth every morning for 30 days.  Dispense: 30 Capsule; Refill: 0  - amphetamine-dextroamphetamine (ADDERALL XR) 15 MG XR capsule; Take 1 Capsule by mouth every morning for 30 days.  Dispense: 30 Capsule; Refill: 0        In prescribing controlled substances to this patient, I certify that I have obtained and reviewed the medical history of Jeromy Neumann. I have also made a good myles effort to obtain applicable records from other providers who have treated the patient and records did not demonstrate any increased risk of substance abuse that would prevent me from prescribing controlled substances.     I have conducted a physical exam and documented it. I have reviewed Mr. Neumann’s prescription history as maintained by the [place] Prescription Monitoring Program.

## 2023-04-06 ENCOUNTER — TELEPHONE (OUTPATIENT)
Dept: MEDICAL GROUP | Facility: LAB | Age: 23
End: 2023-04-06
Payer: COMMERCIAL

## 2023-04-07 NOTE — TELEPHONE ENCOUNTER
MEDICATION PRIOR AUTHORIZATION NEEDED:    1. Name of Medication: Adderall 15mg tab     2. Requested By (Name of Pharmacy): [facility]      3. Is insurance on file current? Yes    4. What is the name & phone number of the 3rd party payor? [facility]     Key: [id number]      Drug is a covered benefit by current benefit plan.       Faxed info to [facility] at [phone].

## 2023-07-09 DIAGNOSIS — F90.0 ATTENTION DEFICIT HYPERACTIVITY DISORDER (ADHD), INATTENTIVE TYPE, MODERATE: ICD-10-CM

## 2023-07-10 RX ORDER — DEXTROAMPHETAMINE SACCHARATE, AMPHETAMINE ASPARTATE MONOHYDRATE, DEXTROAMPHETAMINE SULFATE AND AMPHETAMINE SULFATE 3.75; 3.75; 3.75; 3.75 MG/1; MG/1; MG/1; MG/1
15 CAPSULE, EXTENDED RELEASE ORAL EVERY MORNING
Qty: 30 CAPSULE | Refills: 0 | Status: SHIPPED | OUTPATIENT
Start: 2023-07-10 | End: 2023-09-11 | Stop reason: SDUPTHER

## 2023-07-10 NOTE — TELEPHONE ENCOUNTER
Received request via: Patient    Was the patient seen in the last year in this department? Yes    Does the patient have an active prescription (recently filled or refills available) for medication(s) requested? No    Does the patient have long-term Plus and need 100 day supply (blood pressure, diabetes and cholesterol meds only)? Patient does not have SCP    amphetamine-dextroamphetamine (ADDERALL XR) 15 MG XR capsule

## 2023-09-11 ENCOUNTER — OFFICE VISIT (OUTPATIENT)
Dept: MEDICAL GROUP | Facility: LAB | Age: 23
End: 2023-09-11
Payer: COMMERCIAL

## 2023-09-11 VITALS
BODY MASS INDEX: 24.36 KG/M2 | SYSTOLIC BLOOD PRESSURE: 120 MMHG | HEART RATE: 102 BPM | HEIGHT: 71 IN | TEMPERATURE: 97.6 F | OXYGEN SATURATION: 97 % | WEIGHT: 174 LBS | RESPIRATION RATE: 12 BRPM | DIASTOLIC BLOOD PRESSURE: 80 MMHG

## 2023-09-11 DIAGNOSIS — F90.0 ATTENTION DEFICIT HYPERACTIVITY DISORDER (ADHD), INATTENTIVE TYPE, MODERATE: ICD-10-CM

## 2023-09-11 DIAGNOSIS — T75.3XXA MOTION SICKNESS, INITIAL ENCOUNTER: ICD-10-CM

## 2023-09-11 DIAGNOSIS — Z00.00 WELL ADULT EXAM: ICD-10-CM

## 2023-09-11 PROCEDURE — 99395 PREV VISIT EST AGE 18-39: CPT | Performed by: NURSE PRACTITIONER

## 2023-09-11 PROCEDURE — 3079F DIAST BP 80-89 MM HG: CPT | Performed by: NURSE PRACTITIONER

## 2023-09-11 PROCEDURE — 3074F SYST BP LT 130 MM HG: CPT | Performed by: NURSE PRACTITIONER

## 2023-09-11 RX ORDER — SCOLOPAMINE TRANSDERMAL SYSTEM 1 MG/1
1 PATCH, EXTENDED RELEASE TRANSDERMAL
Qty: 24 PATCH | Refills: 3 | Status: SHIPPED | OUTPATIENT
Start: 2023-09-11

## 2023-09-11 RX ORDER — DEXTROAMPHETAMINE SACCHARATE, AMPHETAMINE ASPARTATE MONOHYDRATE, DEXTROAMPHETAMINE SULFATE AND AMPHETAMINE SULFATE 3.75; 3.75; 3.75; 3.75 MG/1; MG/1; MG/1; MG/1
15 CAPSULE, EXTENDED RELEASE ORAL EVERY MORNING
Qty: 30 CAPSULE | Refills: 0 | Status: SHIPPED | OUTPATIENT
Start: 2023-09-11 | End: 2023-11-29 | Stop reason: SDUPTHER

## 2023-09-11 SDOH — HEALTH STABILITY: PHYSICAL HEALTH: ON AVERAGE, HOW MANY DAYS PER WEEK DO YOU ENGAGE IN MODERATE TO STRENUOUS EXERCISE (LIKE A BRISK WALK)?: 7 DAYS

## 2023-09-11 SDOH — ECONOMIC STABILITY: HOUSING INSECURITY: IN THE LAST 12 MONTHS, HOW MANY PLACES HAVE YOU LIVED?: 2

## 2023-09-11 SDOH — HEALTH STABILITY: MENTAL HEALTH
STRESS IS WHEN SOMEONE FEELS TENSE, NERVOUS, ANXIOUS, OR CAN'T SLEEP AT NIGHT BECAUSE THEIR MIND IS TROUBLED. HOW STRESSED ARE YOU?: ONLY A LITTLE

## 2023-09-11 SDOH — ECONOMIC STABILITY: INCOME INSECURITY: HOW HARD IS IT FOR YOU TO PAY FOR THE VERY BASICS LIKE FOOD, HOUSING, MEDICAL CARE, AND HEATING?: NOT VERY HARD

## 2023-09-11 SDOH — ECONOMIC STABILITY: HOUSING INSECURITY
IN THE LAST 12 MONTHS, WAS THERE A TIME WHEN YOU DID NOT HAVE A STEADY PLACE TO SLEEP OR SLEPT IN A SHELTER (INCLUDING NOW)?: NO

## 2023-09-11 SDOH — ECONOMIC STABILITY: FOOD INSECURITY: WITHIN THE PAST 12 MONTHS, YOU WORRIED THAT YOUR FOOD WOULD RUN OUT BEFORE YOU GOT MONEY TO BUY MORE.: SOMETIMES TRUE

## 2023-09-11 SDOH — ECONOMIC STABILITY: FOOD INSECURITY: WITHIN THE PAST 12 MONTHS, THE FOOD YOU BOUGHT JUST DIDN'T LAST AND YOU DIDN'T HAVE MONEY TO GET MORE.: SOMETIMES TRUE

## 2023-09-11 SDOH — ECONOMIC STABILITY: INCOME INSECURITY: IN THE LAST 12 MONTHS, WAS THERE A TIME WHEN YOU WERE NOT ABLE TO PAY THE MORTGAGE OR RENT ON TIME?: NO

## 2023-09-11 SDOH — ECONOMIC STABILITY: TRANSPORTATION INSECURITY
IN THE PAST 12 MONTHS, HAS THE LACK OF TRANSPORTATION KEPT YOU FROM MEDICAL APPOINTMENTS OR FROM GETTING MEDICATIONS?: YES

## 2023-09-11 SDOH — ECONOMIC STABILITY: TRANSPORTATION INSECURITY
IN THE PAST 12 MONTHS, HAS LACK OF TRANSPORTATION KEPT YOU FROM MEETINGS, WORK, OR FROM GETTING THINGS NEEDED FOR DAILY LIVING?: NO

## 2023-09-11 SDOH — ECONOMIC STABILITY: TRANSPORTATION INSECURITY
IN THE PAST 12 MONTHS, HAS LACK OF RELIABLE TRANSPORTATION KEPT YOU FROM MEDICAL APPOINTMENTS, MEETINGS, WORK OR FROM GETTING THINGS NEEDED FOR DAILY LIVING?: NO

## 2023-09-11 SDOH — HEALTH STABILITY: PHYSICAL HEALTH: ON AVERAGE, HOW MANY MINUTES DO YOU ENGAGE IN EXERCISE AT THIS LEVEL?: 30 MIN

## 2023-09-11 ASSESSMENT — LIFESTYLE VARIABLES
SKIP TO QUESTIONS 9-10: 0
HOW OFTEN DO YOU HAVE A DRINK CONTAINING ALCOHOL: 2-4 TIMES A MONTH
HOW MANY STANDARD DRINKS CONTAINING ALCOHOL DO YOU HAVE ON A TYPICAL DAY: 1 OR 2
HOW OFTEN DO YOU HAVE SIX OR MORE DRINKS ON ONE OCCASION: LESS THAN MONTHLY
AUDIT-C TOTAL SCORE: 3

## 2023-09-11 ASSESSMENT — SOCIAL DETERMINANTS OF HEALTH (SDOH)
WITHIN THE PAST 12 MONTHS, YOU WORRIED THAT YOUR FOOD WOULD RUN OUT BEFORE YOU GOT THE MONEY TO BUY MORE: SOMETIMES TRUE
HOW OFTEN DO YOU HAVE A DRINK CONTAINING ALCOHOL: 2-4 TIMES A MONTH
ARE YOU MARRIED, WIDOWED, DIVORCED, SEPARATED, NEVER MARRIED, OR LIVING WITH A PARTNER?: NEVER MARRIED
HOW MANY DRINKS CONTAINING ALCOHOL DO YOU HAVE ON A TYPICAL DAY WHEN YOU ARE DRINKING: 1 OR 2
HOW OFTEN DO YOU HAVE SIX OR MORE DRINKS ON ONE OCCASION: LESS THAN MONTHLY
HOW OFTEN DO YOU ATTEND CHURCH OR RELIGIOUS SERVICES?: NEVER
IN A TYPICAL WEEK, HOW MANY TIMES DO YOU TALK ON THE PHONE WITH FAMILY, FRIENDS, OR NEIGHBORS?: MORE THAN THREE TIMES A WEEK
HOW HARD IS IT FOR YOU TO PAY FOR THE VERY BASICS LIKE FOOD, HOUSING, MEDICAL CARE, AND HEATING?: NOT VERY HARD
HOW OFTEN DO YOU GET TOGETHER WITH FRIENDS OR RELATIVES?: TWICE A WEEK
IN A TYPICAL WEEK, HOW MANY TIMES DO YOU TALK ON THE PHONE WITH FAMILY, FRIENDS, OR NEIGHBORS?: MORE THAN THREE TIMES A WEEK
HOW OFTEN DO YOU GET TOGETHER WITH FRIENDS OR RELATIVES?: TWICE A WEEK
HOW OFTEN DO YOU ATTENT MEETINGS OF THE CLUB OR ORGANIZATION YOU BELONG TO?: NEVER
HOW OFTEN DO YOU ATTENT MEETINGS OF THE CLUB OR ORGANIZATION YOU BELONG TO?: NEVER
DO YOU BELONG TO ANY CLUBS OR ORGANIZATIONS SUCH AS CHURCH GROUPS UNIONS, FRATERNAL OR ATHLETIC GROUPS, OR SCHOOL GROUPS?: NO
HOW OFTEN DO YOU ATTEND CHURCH OR RELIGIOUS SERVICES?: NEVER
DO YOU BELONG TO ANY CLUBS OR ORGANIZATIONS SUCH AS CHURCH GROUPS UNIONS, FRATERNAL OR ATHLETIC GROUPS, OR SCHOOL GROUPS?: NO
ARE YOU MARRIED, WIDOWED, DIVORCED, SEPARATED, NEVER MARRIED, OR LIVING WITH A PARTNER?: NEVER MARRIED

## 2023-09-11 ASSESSMENT — PATIENT HEALTH QUESTIONNAIRE - PHQ9: CLINICAL INTERPRETATION OF PHQ2 SCORE: 0

## 2023-09-11 NOTE — PROGRESS NOTES
Subjective:     CC:   Chief Complaint   Patient presents with    Annual Exam       HPI:   Jeromy Neumann is an established 23 y.o. male who presents today accompanied by his girlfriend, Raquel, for annual exam as well as concerns about motion sickness, he travels and works at [employer]. He expressed interest in getting baseline blood work completed as well.      Family History   Problem Relation Age of Onset    Hyperlipidemia Father     Hypertension Father     Arthritis Maternal Grandmother         rheumatoid    Lung Disease Maternal Grandmother         asthma    Cancer Paternal Grandmother         lung    Diabetes Paternal Grandmother     Heart Disease Neg Hx     Stroke Neg Hx      Social History     Tobacco Use    Smoking status: Never    Smokeless tobacco: Never   Substance Use Topics    Alcohol use: No    Drug use: No     He  has no history on file for sexual activity.    Patient Active Problem List    Diagnosis Date Noted    Attention deficit hyperactivity disorder (ADHD), inattentive type, moderate 10/06/2022    Chronic posttraumatic stress syndrome 08/30/2022    Flexural eczema 01/20/2021    Dysthymia 07/02/2019    Acne vulgaris 09/15/2017     Current Outpatient Medications   Medication Sig Dispense Refill    amphetamine-dextroamphetamine (ADDERALL XR) 15 MG XR capsule Take 1 Capsule by mouth every morning for 30 days. 30 Capsule 0    scopolamine (TRANSDERM SCOP, 1.5 MG,) 1 mg/72hr PATCH 72 HR Place 1 Patch on the skin every 72 hours. 24 Patch 3    buPROPion (WELLBUTRIN XL) 300 MG XL tablet Take 1 Tablet by mouth every morning. 90 Tablet 3    triamcinolone acetonide (KENALOG) 0.1 % Cream Apply 1 Application topically 2 times a day. 28.4 g 1     No current facility-administered medications for this visit.     Allergies   Allergen Reactions    Clarithromycin Vomiting       Review of Systems   Constitutional: Negative for fever, chills and malaise/fatigue.   HENT: Negative for congestion.   Eyes: Negative for  "pain.   Respiratory: Negative for cough and shortness of breath.    Cardiovascular: Negative for chest pain and leg swelling.   Gastrointestinal: Negative for nausea, vomiting, abdominal pain and diarrhea.   Genitourinary: Negative for dysuria and hematuria.   Skin: Negative for rash.   Neurological: Negative for dizziness, focal weakness and headaches.   Endo/Heme/Allergies: Does not bruise/bleed easily.   Psychiatric/Behavioral: Negative for depression.  The patient is not nervous/anxious.      Objective:   /80 (BP Location: Left arm, Patient Position: Sitting, BP Cuff Size: Adult)   Pulse (!) 102   Temp 36.4 °C (97.6 °F)   Resp 12   Ht 1.81 m (5' 11.26\")   Wt 78.9 kg (174 lb)   SpO2 97%   BMI 24.09 kg/m²      Wt Readings from Last 4 Encounters:   09/11/23 78.9 kg (174 lb)   12/28/22 88.9 kg (196 lb)   09/29/22 90.3 kg (199 lb)   08/30/22 93.4 kg (206 lb)       Physical Exam:  Constitutional: Well-developed and well-nourished. Not diaphoretic. No distress.   Skin: Skin is warm and dry. No rash or eczema noted.  Head: Atraumatic without lesions.  Eyes: Conjunctivae and extraocular motions are normal. Pupils are equal, round, and reactive to light. No scleral icterus.   Ears:  External ears unremarkable. Tympanic membranes clear and intact. Bilateral ear wax noted and removed.  Cardiovascular: Regular rate and rhythm, S1 and S2 without murmur, rubs, or gallops.    Respiratory: Effort normal. Clear to auscultation throughout. No adventitious sounds.   Abdomen: Soft, non tender, and without distention. Active bowel sounds in all four quadrants. No rebound, guarding, masses or HSM.  Extremities: No cyanosis, clubbing, erythema, nor edema. Distal pulses intact and symmetric.   Musculoskeletal: All major joints AROM full in all directions without pain.  Neurological: Alert and oriented x 3. Grossly non-focal. Strength and sensation grossly intact.  Psychiatric:  Behavior, mood, and affect are " appropriate.      Assessment and Plan:     1. Well adult exam  - Comp Metabolic Panel; Future  - CBC WITH DIFFERENTIAL; Future  - Lipid Profile; Future    2. Attention deficit hyperactivity disorder (ADHD), inattentive type, moderate  - amphetamine-dextroamphetamine (ADDERALL XR) 15 MG XR capsule; Take 1 Capsule by mouth every morning for 30 days.  Dispense: 30 Capsule; Refill: 0  - Controlled Substance Treatment Agreement  - Pain Management Screen; Future  In prescribing controlled substances to this patient, I certify that I have obtained and reviewed the medical history of Jeromy Neumann. I have also made a good myles effort to obtain applicable records from other providers who have treated the patient and records did not demonstrate any increased risk of substance abuse that would prevent me from prescribing controlled substances.     I have conducted a physical exam and documented it. I have reviewed Mr. Neumann’s prescription history as maintained by the [place] Prescription Monitoring Program.       3. Motion sickness, initial encounter  - scopolamine (TRANSDERM SCOP, 1.5 MG,) 1 mg/72hr PATCH 72 HR; Place 1 Patch on the skin every 72 hours.  Dispense: 24 Patch; Refill: 3  Discussed uses and side effects      Immunizations: He expressed interest in the influenza vaccine, which we do not have currently. We told him to to check with his pharmacy and that the Covid-19 booster will be available in October.     Patient counseled about skin care, he is working mostly outside at [employer], we encouraged him to continue using sunscreen and limit sun exposure as much as possible.       Follow-up: Yearly or as needed

## 2023-09-11 NOTE — PROGRESS NOTES
Subjective:     CC:   Chief Complaint   Patient presents with    Annual Exam       HPI:   Jeromy Neumann is a 23 y.o. male who presents for annual exam.  Working at [employer] - happy there.  Feeling great.      I saw the patient with the student.  I performed a physical exam and medical decision making.  I reviewed, verified, the documentation on this date and agree with the content and plan as written by this student.      He  has a past medical history of Allergy.  He  has no past surgical history on file.    Family History   Problem Relation Age of Onset    Hyperlipidemia Father     Hypertension Father     Arthritis Maternal Grandmother         rheumatoid    Lung Disease Maternal Grandmother         asthma    Cancer Paternal Grandmother         lung    Diabetes Paternal Grandmother     Heart Disease Neg Hx     Stroke Neg Hx      Social History     Tobacco Use    Smoking status: Never    Smokeless tobacco: Never   Substance Use Topics    Alcohol use: No    Drug use: No     He  has no history on file for sexual activity.    Patient Active Problem List    Diagnosis Date Noted    Attention deficit hyperactivity disorder (ADHD), inattentive type, moderate 10/06/2022    Chronic posttraumatic stress syndrome 08/30/2022    Flexural eczema 01/20/2021    Dysthymia 07/02/2019    Acne vulgaris 09/15/2017     Current Outpatient Medications   Medication Sig Dispense Refill    amphetamine-dextroamphetamine (ADDERALL XR) 15 MG XR capsule Take 1 Capsule by mouth every morning for 30 days. 30 Capsule 0    scopolamine (TRANSDERM SCOP, 1.5 MG,) 1 mg/72hr PATCH 72 HR Place 1 Patch on the skin every 72 hours. 24 Patch 3    buPROPion (WELLBUTRIN XL) 300 MG XL tablet Take 1 Tablet by mouth every morning. 90 Tablet 3    triamcinolone acetonide (KENALOG) 0.1 % Cream Apply 1 Application topically 2 times a day. 28.4 g 1     No current facility-administered medications for this visit.     Allergies   Allergen Reactions    Clarithromycin Vomiting  "      Review of Systems   Constitutional: Negative for fever, chills and malaise/fatigue.   HENT: Negative for congestion.    Eyes: Negative for pain.   Respiratory: Negative for cough and shortness of breath.    Cardiovascular: Negative for chest pain and leg swelling.   Gastrointestinal: Negative for nausea, vomiting, abdominal pain and diarrhea.   Genitourinary: Negative for dysuria and hematuria.   Skin: Negative for rash.   Neurological: Negative for dizziness, focal weakness and headaches.   Endo/Heme/Allergies: Does not bruise/bleed easily.   Psychiatric/Behavioral: Negative for depression.  The patient is not nervous/anxious.      Objective:   /80 (BP Location: Left arm, Patient Position: Sitting, BP Cuff Size: Adult)   Pulse (!) 102   Temp 36.4 °C (97.6 °F)   Resp 12   Ht 1.81 m (5' 11.26\")   Wt 78.9 kg (174 lb)   SpO2 97%   BMI 24.09 kg/m²      Wt Readings from Last 4 Encounters:   09/11/23 78.9 kg (174 lb)   12/28/22 88.9 kg (196 lb)   09/29/22 90.3 kg (199 lb)   08/30/22 93.4 kg (206 lb)       Physical Exam:  Constitutional: Well-developed and well-nourished. Not diaphoretic. No distress.   Skin: Skin is warm and dry. No rash noted.  Head: Atraumatic without lesions.  Eyes: Conjunctivae and extraocular motions are normal. Pupils are equal, round, and reactive to light. No scleral icterus.   Ears:  External ears unremarkable. Tympanic membranes clear and intact.  Nose: Nares patent. Septum midline. Turbinates without erythema nor edema. No discharge.   Mouth/Throat: Tongue normal. Oropharynx is clear and moist. Posterior pharynx without erythema or exudates.  Neck: Supple, trachea midline. Normal range of motion. No thyromegaly present. No lymphadenopathy--cervical or supraclavicular.  Cardiovascular: Regular rate and rhythm, S1 and S2 without murmur, rubs, or gallops.    Respiratory: Effort normal. Clear to auscultation throughout. No adventitious sounds.   Abdomen: Soft, non tender, and " without distention. Active bowel sounds in all four quadrants. No rebound, guarding, masses or HSM.  Extremities: No cyanosis, clubbing, erythema, nor edema. Distal pulses intact and symmetric.   Musculoskeletal: All major joints AROM full in all directions without pain.  Neurological: Alert and oriented x 3. Grossly non-focal. Strength and sensation grossly intact. DTRs 2+/3 and symmetric.   Psychiatric:  Behavior, mood, and affect are appropriate.      Assessment and Plan:     1. Well adult exam    2. Attention deficit hyperactivity disorder (ADHD), inattentive type, moderate  - amphetamine-dextroamphetamine (ADDERALL XR) 15 MG XR capsule; Take 1 Capsule by mouth every morning for 30 days.  Dispense: 30 Capsule; Refill: 0  - Controlled Substance Treatment Agreement  - Pain Management Screen; Future    3. Motion sickness, initial encounter  - scopolamine (TRANSDERM SCOP, 1.5 MG,) 1 mg/72hr PATCH 72 HR; Place 1 Patch on the skin every 72 hours.  Dispense: 24 Patch; Refill: 3        The main concern was periodic motion sickness unrelieved by Dramamine.  Works at [employer].  Given scopolamine patches and discussed how to use these as well as side effects.  We discussed preventative screening lab work which was ordered for him to do and he will complete this when he is home in January.  Denies STD concerns.  Encouraged influenza and COVID update in October.  Stable on current Adderall dosage, this was refilled and UDS/CSA updated.  Discussed skin protection from the sun.  Follow-up 6 months regarding Adderall.  Moods stable on Wellbutrin.  In prescribing controlled substances to this patient, I certify that I have obtained and reviewed the medical history of Jeromy Nel. I have also made a good myles effort to obtain applicable records from other providers who have treated the patient and records did not demonstrate any increased risk of substance abuse that would prevent me from prescribing controlled substances.      I have conducted a physical exam and documented it. I have reviewed Mr. Neumann’s prescription history as maintained by the [place] Prescription Monitoring Program.     Anticipatory guidance:  Encouraged daily physical exercise, high fiber / vegetable based diet, 8 hours of sleep at night, skin protection from sun with suncreen / clothing, yearly derm consults.

## 2023-11-29 ENCOUNTER — PATIENT MESSAGE (OUTPATIENT)
Dept: MEDICAL GROUP | Facility: LAB | Age: 23
End: 2023-11-29
Payer: COMMERCIAL

## 2023-11-29 DIAGNOSIS — F90.0 ATTENTION DEFICIT HYPERACTIVITY DISORDER (ADHD), INATTENTIVE TYPE, MODERATE: ICD-10-CM

## 2023-11-29 RX ORDER — DEXTROAMPHETAMINE SACCHARATE, AMPHETAMINE ASPARTATE MONOHYDRATE, DEXTROAMPHETAMINE SULFATE AND AMPHETAMINE SULFATE 3.75; 3.75; 3.75; 3.75 MG/1; MG/1; MG/1; MG/1
15 CAPSULE, EXTENDED RELEASE ORAL EVERY MORNING
Qty: 30 CAPSULE | Refills: 0 | Status: SHIPPED | OUTPATIENT
Start: 2023-12-29 | End: 2024-01-28

## 2023-11-29 RX ORDER — DEXTROAMPHETAMINE SACCHARATE, AMPHETAMINE ASPARTATE MONOHYDRATE, DEXTROAMPHETAMINE SULFATE AND AMPHETAMINE SULFATE 3.75; 3.75; 3.75; 3.75 MG/1; MG/1; MG/1; MG/1
15 CAPSULE, EXTENDED RELEASE ORAL EVERY MORNING
Qty: 30 CAPSULE | Refills: 0 | Status: SHIPPED | OUTPATIENT
Start: 2023-11-29 | End: 2023-12-29

## 2023-11-29 RX ORDER — DEXTROAMPHETAMINE SACCHARATE, AMPHETAMINE ASPARTATE MONOHYDRATE, DEXTROAMPHETAMINE SULFATE AND AMPHETAMINE SULFATE 3.75; 3.75; 3.75; 3.75 MG/1; MG/1; MG/1; MG/1
15 CAPSULE, EXTENDED RELEASE ORAL EVERY MORNING
Qty: 30 CAPSULE | Refills: 0 | Status: SHIPPED | OUTPATIENT
Start: 2024-01-28 | End: 2024-03-08 | Stop reason: SDUPTHER

## 2024-03-08 DIAGNOSIS — F90.0 ATTENTION DEFICIT HYPERACTIVITY DISORDER (ADHD), INATTENTIVE TYPE, MODERATE: ICD-10-CM

## 2024-03-08 DIAGNOSIS — F32.A MILD DEPRESSION: ICD-10-CM

## 2024-03-08 NOTE — TELEPHONE ENCOUNTER
Received request via: Patient    Was the patient seen in the last year in this department? Yes LOV: 9/11/23    Does the patient have an active prescription (recently filled or refills available) for medication(s) requested? No    Pharmacy Name: [facility]    Does the patient have assisted Plus and need 100 day supply (blood pressure, diabetes and cholesterol meds only)? Patient does not have SCP

## 2024-03-08 NOTE — TELEPHONE ENCOUNTER
Received request via: Pharmacy    Was the patient seen in the last year in this department? Yes LOV: 9/11/23    Does the patient have an active prescription (recently filled or refills available) for medication(s) requested? No    Pharmacy Name: [facility]    Does the patient have correction Plus and need 100 day supply (blood pressure, diabetes and cholesterol meds only)? Patient does not have SCP

## 2024-03-10 RX ORDER — DEXTROAMPHETAMINE SACCHARATE, AMPHETAMINE ASPARTATE MONOHYDRATE, DEXTROAMPHETAMINE SULFATE AND AMPHETAMINE SULFATE 3.75; 3.75; 3.75; 3.75 MG/1; MG/1; MG/1; MG/1
15 CAPSULE, EXTENDED RELEASE ORAL EVERY MORNING
Qty: 30 CAPSULE | Refills: 0 | Status: SHIPPED | OUTPATIENT
Start: 2024-03-10 | End: 2024-04-09

## 2024-03-10 RX ORDER — BUPROPION HYDROCHLORIDE 150 MG/1
150 TABLET ORAL EVERY MORNING
Qty: 90 TABLET | Refills: 0 | Status: SHIPPED | OUTPATIENT
Start: 2024-03-10

## 2024-04-08 ENCOUNTER — OFFICE VISIT (OUTPATIENT)
Dept: MEDICAL GROUP | Facility: LAB | Age: 24
End: 2024-04-08
Payer: COMMERCIAL

## 2024-04-08 VITALS
DIASTOLIC BLOOD PRESSURE: 60 MMHG | OXYGEN SATURATION: 96 % | RESPIRATION RATE: 12 BRPM | HEIGHT: 71 IN | TEMPERATURE: 98.1 F | BODY MASS INDEX: 26.46 KG/M2 | HEART RATE: 64 BPM | WEIGHT: 189 LBS | SYSTOLIC BLOOD PRESSURE: 98 MMHG

## 2024-04-08 DIAGNOSIS — R51.9 GENERALIZED HEADACHES: ICD-10-CM

## 2024-04-08 DIAGNOSIS — Z23 NEED FOR VACCINATION: ICD-10-CM

## 2024-04-08 PROCEDURE — 90715 TDAP VACCINE 7 YRS/> IM: CPT | Performed by: NURSE PRACTITIONER

## 2024-04-08 PROCEDURE — 3078F DIAST BP <80 MM HG: CPT | Performed by: NURSE PRACTITIONER

## 2024-04-08 PROCEDURE — 90471 IMMUNIZATION ADMIN: CPT | Performed by: NURSE PRACTITIONER

## 2024-04-08 PROCEDURE — 3074F SYST BP LT 130 MM HG: CPT | Performed by: NURSE PRACTITIONER

## 2024-04-08 PROCEDURE — 99213 OFFICE O/P EST LOW 20 MIN: CPT | Mod: 25 | Performed by: NURSE PRACTITIONER

## 2024-04-08 RX ORDER — KETOROLAC TROMETHAMINE 30 MG/ML
60 INJECTION, SOLUTION INTRAMUSCULAR; INTRAVENOUS ONCE
Status: COMPLETED | OUTPATIENT
Start: 2024-04-08 | End: 2024-04-08

## 2024-04-08 RX ADMIN — KETOROLAC TROMETHAMINE 60 MG: 30 INJECTION, SOLUTION INTRAMUSCULAR; INTRAVENOUS at 15:26

## 2024-04-08 ASSESSMENT — PATIENT HEALTH QUESTIONNAIRE - PHQ9
1. LITTLE INTEREST OR PLEASURE IN DOING THINGS: NOT AT ALL
8. MOVING OR SPEAKING SO SLOWLY THAT OTHER PEOPLE COULD HAVE NOTICED. OR THE OPPOSITE, BEING SO FIGETY OR RESTLESS THAT YOU HAVE BEEN MOVING AROUND A LOT MORE THAN USUAL: NOT AT ALL
2. FEELING DOWN, DEPRESSED, IRRITABLE, OR HOPELESS: NOT AT ALL
SUM OF ALL RESPONSES TO PHQ9 QUESTIONS 1 AND 2: 0
3. TROUBLE FALLING OR STAYING ASLEEP OR SLEEPING TOO MUCH: NOT AT ALL
9. THOUGHTS THAT YOU WOULD BE BETTER OFF DEAD, OR OF HURTING YOURSELF: NOT AT ALL
6. FEELING BAD ABOUT YOURSELF - OR THAT YOU ARE A FAILURE OR HAVE LET YOURSELF OR YOUR FAMILY DOWN: NOT AL ALL
4. FEELING TIRED OR HAVING LITTLE ENERGY: NOT AT ALL
SUM OF ALL RESPONSES TO PHQ QUESTIONS 1-9: 0
7. TROUBLE CONCENTRATING ON THINGS, SUCH AS READING THE NEWSPAPER OR WATCHING TELEVISION: NOT AT ALL
5. POOR APPETITE OR OVEREATING: NOT AT ALL

## 2024-04-08 NOTE — PROGRESS NOTES
Verbal consent was acquired by the patient to use [employer] ambient listening note generation during this visit Yes      Caty Neumann is a 24 y.o. male who presents for headache.  History of Present Illness  The patient is a 24-year-old established male here for headache.      The patient reports experiencing intermittent headaches, which commenced approximately 2 weeks ago. He has a history of headaches, typically triggered by inadequate hydration and sleep. The frequency of these headaches has escalated recently, typically occurring once daily x a few hours per day x 2 weeks.   The patient recently returned from a 6-day trip to [place] on 04/02/2024, during which he experienced a severe headache that did not respond to medication one of the days - other days of HA while in [place] did respond to advil. The headaches are generalized, with varying locations of pain. Despite not experiencing a headache today, he has experienced nasal congestion for the past 1.5 weeks, characterized by clear nasal discharge. The patient experiences nausea in conjunction with motion sickness, which typically lasts a few hours and resolve spontaneously. He reports that the current headache x 2 weeks is new to him in terms of duration. The patient does not take allergy medication. He denies experiencing itchy eyes but reports nasal drainage / congestion. He denies any history of head trauma. His neurologic function is intact, with no difficulty typing, writing, or talking. His sleep is generally uninterrupted, with achieving approximately 6 hours of sleep per night. He occasionally experiences blurry vision, which he attributes to his nearsightedness. The patient is currently in school - one class and has 3 jobs.  His work schedule is irregular. He resides at home.   His mother gets headaches.    Review of Systems  Neg except hpi  Objective   BP 98/60 (BP Location: Right arm, Patient Position: Sitting, BP Cuff Size:  "Large adult)   Pulse 64   Temp 36.7 °C (98.1 °F)   Resp 12   Ht 1.81 m (5' 11.26\")   Wt 85.7 kg (189 lb)   SpO2 96%   Physical Exam  Gen: NAD  Resp: nonlabored.  Able to speak in full sentences.  Lungs are clear to auscultation bilaterally  ENT: Tympanic membranes translucent bilaterally.  Oropharynx without erythema or exudate.  No sinus tenderness.  Nasal mucosa is boggy.  Psy: pleasant / cooperative.   Neuro:  Alert and oriented x 3      Assessment & Plan  1.  Generalized headache x 2-week duration:  The patient's headaches may be multifaceted considering he is in school, works 3 jobs and recently had a 6-day trip to [facility].  However, he does not present with a sinus infection due to lack of fever, sinus tenderness and purulent mucus production. The patient will receive a Toradol injection today to try to break the headache cycle. Additionally, he will commence a week-long course of allergy medication, either Claritin, Zyrtec or Allegra, to alleviate the pressure and congestion. The patient will communicate his progress via [employer] in approximately 72 hours.    Updated Tdap.               Please note that this dictation was created using voice recognition software. I have made every reasonable attempt to correct obvious errors, but I expect that there are errors of grammar and possibly content that I did not discover before finalizing the note.  "

## 2025-02-05 ENCOUNTER — OFFICE VISIT (OUTPATIENT)
Dept: MEDICAL GROUP | Facility: LAB | Age: 25
End: 2025-02-05
Payer: COMMERCIAL

## 2025-02-05 VITALS
HEIGHT: 71 IN | DIASTOLIC BLOOD PRESSURE: 54 MMHG | HEART RATE: 96 BPM | OXYGEN SATURATION: 96 % | BODY MASS INDEX: 30.52 KG/M2 | TEMPERATURE: 96.1 F | RESPIRATION RATE: 12 BRPM | WEIGHT: 218 LBS | SYSTOLIC BLOOD PRESSURE: 100 MMHG

## 2025-02-05 DIAGNOSIS — F90.0 ATTENTION DEFICIT HYPERACTIVITY DISORDER (ADHD), INATTENTIVE TYPE, MODERATE: ICD-10-CM

## 2025-02-05 DIAGNOSIS — F41.9 ANXIETY: ICD-10-CM

## 2025-02-05 DIAGNOSIS — K58.0 IRRITABLE BOWEL SYNDROME WITH DIARRHEA: ICD-10-CM

## 2025-02-05 DIAGNOSIS — Z23 NEED FOR VACCINATION: ICD-10-CM

## 2025-02-05 PROBLEM — F34.1 DYSTHYMIA: Status: RESOLVED | Noted: 2019-07-02 | Resolved: 2025-02-05

## 2025-02-05 PROBLEM — F43.12: Status: RESOLVED | Noted: 2022-08-30 | Resolved: 2025-02-05

## 2025-02-05 PROCEDURE — 3078F DIAST BP <80 MM HG: CPT | Performed by: NURSE PRACTITIONER

## 2025-02-05 PROCEDURE — 99214 OFFICE O/P EST MOD 30 MIN: CPT | Mod: 25 | Performed by: NURSE PRACTITIONER

## 2025-02-05 PROCEDURE — 3074F SYST BP LT 130 MM HG: CPT | Performed by: NURSE PRACTITIONER

## 2025-02-05 PROCEDURE — 90471 IMMUNIZATION ADMIN: CPT

## 2025-02-05 PROCEDURE — 90656 IIV3 VACC NO PRSV 0.5 ML IM: CPT

## 2025-02-05 RX ORDER — DICYCLOMINE HYDROCHLORIDE 10 MG/1
10 CAPSULE ORAL
Qty: 120 CAPSULE | Refills: 0 | Status: SHIPPED | OUTPATIENT
Start: 2025-02-05

## 2025-02-05 ASSESSMENT — PATIENT HEALTH QUESTIONNAIRE - PHQ9: CLINICAL INTERPRETATION OF PHQ2 SCORE: 0

## 2025-02-05 NOTE — PROGRESS NOTES
Verbal consent was acquired by the patient to use [employer] ambient listening note generation during this visit Yes      Subjective   Jeromy Neumann is a 24 y.o. male who presents for GIissues  History of Present Illness  The patient is a 24-year-old male who presents for evaluation of gastrointestinal issues.    He reports a recurrence of previous diarrhea / abd cramping issues, which escalated yesterday but have been present on and off throughout his life. He experienced frequent bowel movements, initially non-diarrheal, but later transitioning to diarrhea. From 8:00 AM to 12:30 PM, he had to use the restroom every 45 minutes, with each episode accompanied by cramping. He did not experience significant gas, nausea, or vomiting. His last bowel movement contained some blood, described as muddy, but not black or resembling coffee grounds. He did not experience any rectal pain or stinging. He also reported severe abdominal cramping when not using the restroom. Over the past month, he has been having multiple bowel movements daily, often using the restroom at work four times, even without the urge to defecate. He does not wake up at night to use the restroom but typically needs to go upon waking. He occasionally experiences morning stomach cramps. He maintains a positive outlook on life and enjoys his job. His diet includes black coffee in the morning, followed by a carb like poptart or bagel, and lunch brought from home, usually leftovers or ready-made meals. He occasionally consumes spicy or fried foods and has recently reduced his alcohol intake. His main source of caffeine is his morning coffee, supplemented by one or two sodas throughout the day. He has been dealing with these symptoms since 2024, with some relief in 2022 and 2023. He has been able to maintain his weight (no unintentional weight loss)  and does not experience heartburn. He speculates that his recent symptoms may be due to soap residue in  "his work environment. He has not researched his symptoms online and has not expressed any concerns about potential diagnoses.     He has been off his antidepressant and ADHD medications for approximately 10 months and reports that his mood remains stable without Wellbutrin.     He took Imodium yesterday, which provided significant relief, and also used Tums to alleviate the pain. He has been advised to follow a bland diet.    SOCIAL HISTORY  He reports not drinking much alcohol recently. He drinks one or two sodas throughout the day. 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    FAMILY HISTORY  He reports no family history of celiac disease or gluten sensitivity.    MEDICATIONS  Discontinued: Wellbutrin    Review of Systems  Negative except for HPI  Objective   /54 (BP Location: Right arm, Patient Position: Sitting, BP Cuff Size: Large adult)   Pulse 96   Temp (!) 35.6 °C (96.1 °F)   Resp 12   Ht 1.81 m (5' 11.26\")   Wt 98.9 kg (218 lb)   SpO2 96%   Physical Exam   Gen. appears healthy in no distress   Skin appropriate for sex and age   Neck trachea is midline  Lungs unlabored breathing  Heart regular rate  Abdomen: Soft and nontender  Neuro gait and station normal   Psych appropriate, calm, interactive, well-groomed    Results         Assessment & Plan  1.  Likely irritable bowel syndrome.  His symptoms, including diarrhea and occasional blood in the stool, are suggestive of irritable bowel syndrome. The presence of blood is likely due to irritation from frequent bowel movements. His diet, which includes processed foods, coffee and soda, may be contributing to his symptoms.  He is advised work harder on a healthy diet, incorporating more fruits and vegetables / fiber, and to engage in regular exercise. He is also encouraged to switch to caffeine-free or sugar-free soda. He is instructed to monitor his symptoms and report any changes, such as heartburn, persistent blood in stool, worsening abdominal pain, " persistent diarrhea.  A prescription for dicyclomine 10 mg, to be taken as needed up to four times daily, will be provided. He is advised to take the medication upon waking with a large glass of water. If his symptoms persist despite this treatment, a referral to a gastroenterologist will be considered. If the dicyclomine does not alleviate his symptoms, a blood test for celiac disease will be conducted.  Also consider cbc / cmp.    2.  Adhd:  improved.  Off meds for now  3. Anxiety / depression: stable off meds.  F/u prn    Follow-up  [facility]               Please note that this dictation was created using voice recognition software. I have made every reasonable attempt to correct obvious errors, but I expect that there are errors of grammar and possibly content that I did not discover before finalizing the note.

## 2025-02-19 ENCOUNTER — APPOINTMENT (OUTPATIENT)
Dept: MEDICAL GROUP | Facility: LAB | Age: 25
End: 2025-02-19
Payer: COMMERCIAL

## 2025-04-30 ENCOUNTER — PATIENT MESSAGE (OUTPATIENT)
Dept: MEDICAL GROUP | Facility: LAB | Age: 25
End: 2025-04-30
Payer: COMMERCIAL

## 2025-04-30 DIAGNOSIS — K58.0 IRRITABLE BOWEL SYNDROME WITH DIARRHEA: ICD-10-CM

## 2025-05-01 RX ORDER — DICYCLOMINE HYDROCHLORIDE 10 MG/1
10 CAPSULE ORAL
Qty: 120 CAPSULE | Refills: 0 | Status: SHIPPED | OUTPATIENT
Start: 2025-05-01

## 2025-05-19 ENCOUNTER — OFFICE VISIT (OUTPATIENT)
Dept: MEDICAL GROUP | Facility: LAB | Age: 25
End: 2025-05-19
Payer: COMMERCIAL

## 2025-05-19 VITALS
SYSTOLIC BLOOD PRESSURE: 120 MMHG | DIASTOLIC BLOOD PRESSURE: 58 MMHG | TEMPERATURE: 96.7 F | HEIGHT: 71 IN | WEIGHT: 215 LBS | HEART RATE: 90 BPM | BODY MASS INDEX: 30.1 KG/M2 | RESPIRATION RATE: 12 BRPM | OXYGEN SATURATION: 96 %

## 2025-05-19 DIAGNOSIS — K58.0 IRRITABLE BOWEL SYNDROME WITH DIARRHEA: ICD-10-CM

## 2025-05-19 DIAGNOSIS — R19.7 DIARRHEA, UNSPECIFIED TYPE: ICD-10-CM

## 2025-05-19 PROCEDURE — 3074F SYST BP LT 130 MM HG: CPT | Performed by: NURSE PRACTITIONER

## 2025-05-19 PROCEDURE — 99214 OFFICE O/P EST MOD 30 MIN: CPT | Performed by: NURSE PRACTITIONER

## 2025-05-19 PROCEDURE — 3078F DIAST BP <80 MM HG: CPT | Performed by: NURSE PRACTITIONER

## 2025-05-19 NOTE — LETTER
May 19, 2025       Patient: Jeromy Neumann   YOB: 2000   Date of Visit: 5/19/2025         To Whom It May Concern:    In my medical opinion, I recommend that Jeromy Neumann remain out of work 5/16/2025 and 5/19/2025 due to illness.      If you have any questions or concerns, please don't hesitate to call [phone]          Sincerely,          BONNIE Gonzales.P.N.  Electronically Signed

## 2025-05-20 NOTE — PROGRESS NOTES
Verbal consent was acquired by the patient to use [employer] ambient listening note generation during this visit Yes      Subjective   Jeromy Neumann is a 25 y.o. male who presents for diarrhea  History of Present Illness  The patient is a 25-year-old male who presents for follow-up on watery stools. He was seen last in 02/2025, and a trial of dicyclomine for irritable bowel syndrome was initiated. He has not had labs since 2021.    He reports that his condition has been stable since the last visit in 02/2025, with dicyclomine effectively managing his symptoms. He has been taking one dose in the morning and another at lunch, which has been sufficient until recently. However, he experienced a sudden onset of watery diarrhea on Thursday night or Friday morning, which has persisted until today. The frequency of his bowel movements has increased to 10-12 times per day, accompanied by crampy abdominal pain. He also reports nausea but no episodes of vomiting, fevers, or chills. His appetite varies, with some instances of inability to eat, while at other times, he can consume a significant amount of food.  He denies feeling flulike.  His girlfriend is not sick.  He also reports a slight decrease in energy levels. Despite having two hard pebble-like stools today, his stools have remained watery throughout the day. He believes the frequency of his bowel movements is decreasing slightly, possibly due to an extra dose of dicyclomine he took today and yesterday.  He was able to eat a bagel sandwich this morning and in and out burger for lunch.    He reports no recent travel, contact with animals, or consumption of unusual food. He mentions that the symptoms started after consuming home-cooked food. He has not eaten sushi or raw foods recently. He reports no body aches. He is requesting a work note for today and Friday. He is currently only on dicyclomine and toothpaste.  He works in the the[employer] business.    FAMILY  "HISTORY  His mother had microscopic colitis after he was born.  His grandmother has rheumatoid arthritis      Objective   /58 (BP Location: Right arm, Patient Position: Sitting, BP Cuff Size: Large adult)   Pulse 90   Temp 35.9 °C (96.7 °F)   Resp 12   Ht 1.81 m (5' 11.26\")   Wt 97.5 kg (215 lb)   SpO2 96%   Physical Exam  Gen. appears healthy in no distress   Skin appropriate for sex and age   Neck trachea is midline  Lungs unlabored breathing  Heart regular rate  Abdomen: Soft and nontender  Neuro gait and station normal   Psych appropriate, calm, interactive, well-groomed       Assessment & Plan  1. Watery stools  Symptoms suggest a potential viral infection rather than a flare-up of irritable bowel syndrome (IBS).  Treatment plan: Responding to IBS medications; advised to increase dicyclomine intake to four times daily before breakfast, lunch, dinner, and bedtime; bland diet recommended for the next few days, gradually reintroducing normal foods as stools become more formed.  Clinical decision making: If condition does not improve within the next 48 hours, recommend stool culture, lab work and follow-up with gastroenterology for possible further investigation.  If he begins to have bloody stool or fever greater than 102, needs ER and to notify me.  2.  IBS: Prior to last week was doing well on only 2 dicyclomine per day with formed bowels on a daily basis.  He should most likely have blood work and see GI regarding long-term diagnoses/confirmation of IBS.    2. Health maintenance  No labs since 2021.  Diagnostic plan: Comprehensive lab workup ordered to screen for high cholesterol, diabetes, thyroid disease, kidney function, and liver function.  Treatment plan: Advised to complete tests at convenience.    Follow-up: None specified.               Please note that this dictation was created using voice recognition software. I have made every reasonable attempt to correct obvious errors, but I expect " that there are errors of grammar and possibly content that I did not discover before finalizing the note.

## 2025-05-23 NOTE — Clinical Note
REFERRAL APPROVAL NOTICE         Sent on May 23, 2025                   Jeromy Darbylie  [address] [place] [address]                   Dear Mr. Neumann,    After a careful review of the medical information and benefit coverage, [facility] has processed your referral. See below for additional details.    If applicable, you must be actively enrolled with your insurance for coverage of the authorized service. If you have any questions regarding your coverage, please contact your insurance directly.    REFERRAL INFORMATION   Referral #:  [id number]  Referred-To Provider    Referred-By Provider:  Gastroenterology    BARTOLO Gonzales   [facility]      [address]  [place] [id number] [phone] [address] [place] [address]  [phone]    Referral Start Date:  05/19/2025  Referral End Date:   05/19/2026             SCHEDULING  If you do not already have an appointment, please call [phone] to make an appointment.     MORE INFORMATION  If you do not already have a [facility] account, sign up at: [email or web address]  You can access your medical information, make appointments, see lab results, billing information, and more.  If you have questions regarding this referral, please contact  the [facility] Referrals department at:             [phone]. Monday - Friday 8:00AM - 5:00PM.     Sincerely,    [facility]

## 2025-07-31 DIAGNOSIS — T75.3XXA MOTION SICKNESS, INITIAL ENCOUNTER: ICD-10-CM

## 2025-08-01 DIAGNOSIS — K58.0 IRRITABLE BOWEL SYNDROME WITH DIARRHEA: ICD-10-CM

## 2025-08-01 RX ORDER — SCOPOLAMINE 1 MG/3D
1 PATCH, EXTENDED RELEASE TRANSDERMAL
Qty: 24 PATCH | Refills: 0 | Status: SHIPPED | OUTPATIENT
Start: 2025-08-01

## 2025-08-01 RX ORDER — DICYCLOMINE HYDROCHLORIDE 10 MG/1
10 CAPSULE ORAL
Qty: 120 CAPSULE | Refills: 0 | Status: SHIPPED | OUTPATIENT
Start: 2025-08-01

## 2025-08-25 ENCOUNTER — HOSPITAL ENCOUNTER (OUTPATIENT)
Facility: MEDICAL CENTER | Age: 25
End: 2025-08-25
Attending: NURSE PRACTITIONER
Payer: COMMERCIAL

## 2025-08-25 ENCOUNTER — HOSPITAL ENCOUNTER (OUTPATIENT)
Dept: LAB | Facility: MEDICAL CENTER | Age: 25
End: 2025-08-25
Attending: NURSE PRACTITIONER
Payer: COMMERCIAL

## 2025-08-25 DIAGNOSIS — R19.7 DIARRHEA, UNSPECIFIED TYPE: ICD-10-CM

## 2025-08-25 LAB
ALBUMIN SERPL BCP-MCNC: 4.4 G/DL (ref 3.2–4.9)
ALBUMIN/GLOB SERPL: 1.6 G/DL
ALP SERPL-CCNC: 88 U/L (ref 30–99)
ALT SERPL-CCNC: 28 U/L (ref 2–50)
ANION GAP SERPL CALC-SCNC: 10 MMOL/L (ref 7–16)
AST SERPL-CCNC: 23 U/L (ref 12–45)
BASOPHILS # BLD AUTO: 0.4 % (ref 0–1.8)
BASOPHILS # BLD: 0.02 K/UL (ref 0–0.12)
BILIRUB SERPL-MCNC: 0.4 MG/DL (ref 0.1–1.5)
BUN SERPL-MCNC: 14 MG/DL (ref 8–22)
CALCIUM ALBUM COR SERPL-MCNC: 9.3 MG/DL (ref 8.5–10.5)
CALCIUM SERPL-MCNC: 9.6 MG/DL (ref 8.5–10.5)
CHLORIDE SERPL-SCNC: 102 MMOL/L (ref 96–112)
CHOLEST SERPL-MCNC: 201 MG/DL (ref 100–199)
CO2 SERPL-SCNC: 25 MMOL/L (ref 20–33)
CREAT SERPL-MCNC: 1.02 MG/DL (ref 0.5–1.4)
EOSINOPHIL # BLD AUTO: 0.04 K/UL (ref 0–0.51)
EOSINOPHIL NFR BLD: 0.9 % (ref 0–6.9)
ERYTHROCYTE [DISTWIDTH] IN BLOOD BY AUTOMATED COUNT: 35.6 FL (ref 35.9–50)
GFR SERPLBLD CREATININE-BSD FMLA CKD-EPI: 106 ML/MIN/1.73 M 2
GLOBULIN SER CALC-MCNC: 2.7 G/DL (ref 1.9–3.5)
GLUCOSE SERPL-MCNC: 99 MG/DL (ref 65–99)
HCT VFR BLD AUTO: 47 % (ref 42–52)
HDLC SERPL-MCNC: 41 MG/DL
HGB BLD-MCNC: 16.6 G/DL (ref 14–18)
IMM GRANULOCYTES # BLD AUTO: 0.02 K/UL (ref 0–0.11)
IMM GRANULOCYTES NFR BLD AUTO: 0.4 % (ref 0–0.9)
LDLC SERPL CALC-MCNC: 126 MG/DL
LYMPHOCYTES # BLD AUTO: 1.82 K/UL (ref 1–4.8)
LYMPHOCYTES NFR BLD: 40.2 % (ref 22–41)
MCH RBC QN AUTO: 29.6 PG (ref 27–33)
MCHC RBC AUTO-ENTMCNC: 35.3 G/DL (ref 32.3–36.5)
MCV RBC AUTO: 83.8 FL (ref 81.4–97.8)
MONOCYTES # BLD AUTO: 0.41 K/UL (ref 0–0.85)
MONOCYTES NFR BLD AUTO: 9.1 % (ref 0–13.4)
NEUTROPHILS # BLD AUTO: 2.22 K/UL (ref 1.82–7.42)
NEUTROPHILS NFR BLD: 49 % (ref 44–72)
NRBC # BLD AUTO: 0 K/UL
NRBC BLD-RTO: 0 /100 WBC (ref 0–0.2)
PLATELET # BLD AUTO: 249 K/UL (ref 164–446)
PMV BLD AUTO: 9.9 FL (ref 9–12.9)
POTASSIUM SERPL-SCNC: 4.1 MMOL/L (ref 3.6–5.5)
PROT SERPL-MCNC: 7.1 G/DL (ref 6–8.2)
RBC # BLD AUTO: 5.61 M/UL (ref 4.7–6.1)
SODIUM SERPL-SCNC: 137 MMOL/L (ref 135–145)
TRIGL SERPL-MCNC: 169 MG/DL (ref 0–149)
TSH SERPL-ACNC: 1.6 UIU/ML (ref 0.38–5.33)
WBC # BLD AUTO: 4.5 K/UL (ref 4.8–10.8)

## 2025-08-25 PROCEDURE — 36415 COLL VENOUS BLD VENIPUNCTURE: CPT

## 2025-08-25 PROCEDURE — 87045 FECES CULTURE AEROBIC BACT: CPT

## 2025-08-25 PROCEDURE — 87899 AGENT NOS ASSAY W/OPTIC: CPT

## 2025-08-25 PROCEDURE — 80053 COMPREHEN METABOLIC PANEL: CPT

## 2025-08-25 PROCEDURE — 84443 ASSAY THYROID STIM HORMONE: CPT

## 2025-08-25 PROCEDURE — 87046 STOOL CULTR AEROBIC BACT EA: CPT

## 2025-08-25 PROCEDURE — 80061 LIPID PANEL: CPT

## 2025-08-25 PROCEDURE — 85025 COMPLETE CBC W/AUTO DIFF WBC: CPT

## 2025-08-26 LAB
E COLI SXT1+2 STL IA: NORMAL
SIGNIFICANT IND 70042: NORMAL
SITE SITE: NORMAL
SOURCE SOURCE: NORMAL

## 2025-08-29 LAB
BACTERIA STL CULT: NORMAL
E COLI SXT1+2 STL IA: NORMAL
SIGNIFICANT IND 70042: NORMAL
SITE SITE: NORMAL
SOURCE SOURCE: NORMAL

## 2025-09-04 ENCOUNTER — APPOINTMENT (OUTPATIENT)
Dept: MEDICAL GROUP | Facility: LAB | Age: 25
End: 2025-09-04
Payer: COMMERCIAL